# Patient Record
Sex: MALE
[De-identification: names, ages, dates, MRNs, and addresses within clinical notes are randomized per-mention and may not be internally consistent; named-entity substitution may affect disease eponyms.]

---

## 2020-07-30 DIAGNOSIS — Z01.818 ENCOUNTER FOR OTHER PREPROCEDURAL EXAMINATION: ICD-10-CM

## 2020-07-30 PROBLEM — Z00.00 ENCOUNTER FOR PREVENTIVE HEALTH EXAMINATION: Status: ACTIVE | Noted: 2020-07-30

## 2020-08-01 ENCOUNTER — APPOINTMENT (OUTPATIENT)
Dept: DISASTER EMERGENCY | Facility: CLINIC | Age: 65
End: 2020-08-01

## 2020-08-01 LAB — SARS-COV-2 N GENE NPH QL NAA+PROBE: NOT DETECTED

## 2024-01-08 ENCOUNTER — INPATIENT (INPATIENT)
Facility: HOSPITAL | Age: 69
LOS: 2 days | Discharge: HOME | DRG: 178 | End: 2024-01-11
Attending: INTERNAL MEDICINE | Admitting: INTERNAL MEDICINE
Payer: MEDICARE

## 2024-01-08 VITALS
WEIGHT: 210.1 LBS | TEMPERATURE: 101 F | SYSTOLIC BLOOD PRESSURE: 167 MMHG | OXYGEN SATURATION: 95 % | HEIGHT: 72 IN | HEART RATE: 91 BPM | RESPIRATION RATE: 18 BRPM | DIASTOLIC BLOOD PRESSURE: 90 MMHG

## 2024-01-08 PROCEDURE — 99285 EMERGENCY DEPT VISIT HI MDM: CPT

## 2024-01-08 NOTE — ED ADULT TRIAGE NOTE - CHIEF COMPLAINT QUOTE
vomiting since this morning 6-7 times   no abdominal pain vomiting since this morning 6-7 times, daughter reports she thinks there is blood in vomit   no abdominal pain

## 2024-01-08 NOTE — ED ADULT NURSE REASSESSMENT NOTE - NS ED NURSE REASSESS COMMENT FT1
pt approaches triage RN while waiting in waiting room, states he is now having new chest pain. EKG completed in triage and presented to MD.

## 2024-01-09 DIAGNOSIS — R50.9 FEVER, UNSPECIFIED: ICD-10-CM

## 2024-01-09 LAB
ALBUMIN SERPL ELPH-MCNC: 3.9 G/DL — SIGNIFICANT CHANGE UP (ref 3.3–5)
ALBUMIN SERPL ELPH-MCNC: 3.9 G/DL — SIGNIFICANT CHANGE UP (ref 3.3–5)
ALP SERPL-CCNC: 56 U/L — SIGNIFICANT CHANGE UP (ref 40–120)
ALP SERPL-CCNC: 56 U/L — SIGNIFICANT CHANGE UP (ref 40–120)
ALT FLD-CCNC: 17 U/L — SIGNIFICANT CHANGE UP (ref 10–45)
ALT FLD-CCNC: 17 U/L — SIGNIFICANT CHANGE UP (ref 10–45)
ANION GAP SERPL CALC-SCNC: 17 MMOL/L — SIGNIFICANT CHANGE UP (ref 5–17)
ANION GAP SERPL CALC-SCNC: 17 MMOL/L — SIGNIFICANT CHANGE UP (ref 5–17)
AST SERPL-CCNC: 32 U/L — SIGNIFICANT CHANGE UP (ref 10–40)
AST SERPL-CCNC: 32 U/L — SIGNIFICANT CHANGE UP (ref 10–40)
BASOPHILS # BLD AUTO: 0.04 K/UL — SIGNIFICANT CHANGE UP (ref 0–0.2)
BASOPHILS # BLD AUTO: 0.04 K/UL — SIGNIFICANT CHANGE UP (ref 0–0.2)
BASOPHILS NFR BLD AUTO: 0.9 % — SIGNIFICANT CHANGE UP (ref 0–2)
BASOPHILS NFR BLD AUTO: 0.9 % — SIGNIFICANT CHANGE UP (ref 0–2)
BILIRUB SERPL-MCNC: 0.7 MG/DL — SIGNIFICANT CHANGE UP (ref 0.2–1.2)
BILIRUB SERPL-MCNC: 0.7 MG/DL — SIGNIFICANT CHANGE UP (ref 0.2–1.2)
BUN SERPL-MCNC: 10 MG/DL — SIGNIFICANT CHANGE UP (ref 7–23)
BUN SERPL-MCNC: 10 MG/DL — SIGNIFICANT CHANGE UP (ref 7–23)
CALCIUM SERPL-MCNC: 9.2 MG/DL — SIGNIFICANT CHANGE UP (ref 8.4–10.5)
CALCIUM SERPL-MCNC: 9.2 MG/DL — SIGNIFICANT CHANGE UP (ref 8.4–10.5)
CHLORIDE SERPL-SCNC: 93 MMOL/L — LOW (ref 96–108)
CHLORIDE SERPL-SCNC: 93 MMOL/L — LOW (ref 96–108)
CO2 SERPL-SCNC: 21 MMOL/L — LOW (ref 22–31)
CO2 SERPL-SCNC: 21 MMOL/L — LOW (ref 22–31)
CREAT SERPL-MCNC: 1.01 MG/DL — SIGNIFICANT CHANGE UP (ref 0.5–1.3)
CREAT SERPL-MCNC: 1.01 MG/DL — SIGNIFICANT CHANGE UP (ref 0.5–1.3)
DACRYOCYTES BLD QL SMEAR: SLIGHT — SIGNIFICANT CHANGE UP
DACRYOCYTES BLD QL SMEAR: SLIGHT — SIGNIFICANT CHANGE UP
EGFR: 81 ML/MIN/1.73M2 — SIGNIFICANT CHANGE UP
EGFR: 81 ML/MIN/1.73M2 — SIGNIFICANT CHANGE UP
EOSINOPHIL # BLD AUTO: 0 K/UL — SIGNIFICANT CHANGE UP (ref 0–0.5)
EOSINOPHIL # BLD AUTO: 0 K/UL — SIGNIFICANT CHANGE UP (ref 0–0.5)
EOSINOPHIL NFR BLD AUTO: 0 % — SIGNIFICANT CHANGE UP (ref 0–6)
EOSINOPHIL NFR BLD AUTO: 0 % — SIGNIFICANT CHANGE UP (ref 0–6)
FLUAV AG NPH QL: SIGNIFICANT CHANGE UP
FLUAV AG NPH QL: SIGNIFICANT CHANGE UP
FLUBV AG NPH QL: SIGNIFICANT CHANGE UP
FLUBV AG NPH QL: SIGNIFICANT CHANGE UP
GLUCOSE SERPL-MCNC: 97 MG/DL — SIGNIFICANT CHANGE UP (ref 70–99)
GLUCOSE SERPL-MCNC: 97 MG/DL — SIGNIFICANT CHANGE UP (ref 70–99)
HCT VFR BLD CALC: 42.2 % — SIGNIFICANT CHANGE UP (ref 39–50)
HCT VFR BLD CALC: 42.2 % — SIGNIFICANT CHANGE UP (ref 39–50)
HGB BLD-MCNC: 14.2 G/DL — SIGNIFICANT CHANGE UP (ref 13–17)
HGB BLD-MCNC: 14.2 G/DL — SIGNIFICANT CHANGE UP (ref 13–17)
LIDOCAIN IGE QN: 22 U/L — SIGNIFICANT CHANGE UP (ref 7–60)
LIDOCAIN IGE QN: 22 U/L — SIGNIFICANT CHANGE UP (ref 7–60)
LYMPHOCYTES # BLD AUTO: 0.39 K/UL — LOW (ref 1–3.3)
LYMPHOCYTES # BLD AUTO: 0.39 K/UL — LOW (ref 1–3.3)
LYMPHOCYTES # BLD AUTO: 8.9 % — LOW (ref 13–44)
LYMPHOCYTES # BLD AUTO: 8.9 % — LOW (ref 13–44)
MAGNESIUM SERPL-MCNC: 2.2 MG/DL — SIGNIFICANT CHANGE UP (ref 1.6–2.6)
MAGNESIUM SERPL-MCNC: 2.2 MG/DL — SIGNIFICANT CHANGE UP (ref 1.6–2.6)
MANUAL SMEAR VERIFICATION: SIGNIFICANT CHANGE UP
MANUAL SMEAR VERIFICATION: SIGNIFICANT CHANGE UP
MCHC RBC-ENTMCNC: 30.9 PG — SIGNIFICANT CHANGE UP (ref 27–34)
MCHC RBC-ENTMCNC: 30.9 PG — SIGNIFICANT CHANGE UP (ref 27–34)
MCHC RBC-ENTMCNC: 33.6 GM/DL — SIGNIFICANT CHANGE UP (ref 32–36)
MCHC RBC-ENTMCNC: 33.6 GM/DL — SIGNIFICANT CHANGE UP (ref 32–36)
MCV RBC AUTO: 91.7 FL — SIGNIFICANT CHANGE UP (ref 80–100)
MCV RBC AUTO: 91.7 FL — SIGNIFICANT CHANGE UP (ref 80–100)
MONOCYTES # BLD AUTO: 0.85 K/UL — SIGNIFICANT CHANGE UP (ref 0–0.9)
MONOCYTES # BLD AUTO: 0.85 K/UL — SIGNIFICANT CHANGE UP (ref 0–0.9)
MONOCYTES NFR BLD AUTO: 19.6 % — HIGH (ref 2–14)
MONOCYTES NFR BLD AUTO: 19.6 % — HIGH (ref 2–14)
NEUTROPHILS # BLD AUTO: 3.08 K/UL — SIGNIFICANT CHANGE UP (ref 1.8–7.4)
NEUTROPHILS # BLD AUTO: 3.08 K/UL — SIGNIFICANT CHANGE UP (ref 1.8–7.4)
NEUTROPHILS NFR BLD AUTO: 68.8 % — SIGNIFICANT CHANGE UP (ref 43–77)
NEUTROPHILS NFR BLD AUTO: 68.8 % — SIGNIFICANT CHANGE UP (ref 43–77)
NEUTS BAND # BLD: 1.8 % — SIGNIFICANT CHANGE UP (ref 0–8)
NEUTS BAND # BLD: 1.8 % — SIGNIFICANT CHANGE UP (ref 0–8)
NT-PROBNP SERPL-SCNC: 381 PG/ML — HIGH (ref 0–300)
NT-PROBNP SERPL-SCNC: 381 PG/ML — HIGH (ref 0–300)
PLAT MORPH BLD: NORMAL — SIGNIFICANT CHANGE UP
PLAT MORPH BLD: NORMAL — SIGNIFICANT CHANGE UP
PLATELET # BLD AUTO: 201 K/UL — SIGNIFICANT CHANGE UP (ref 150–400)
PLATELET # BLD AUTO: 201 K/UL — SIGNIFICANT CHANGE UP (ref 150–400)
POIKILOCYTOSIS BLD QL AUTO: SLIGHT — SIGNIFICANT CHANGE UP
POIKILOCYTOSIS BLD QL AUTO: SLIGHT — SIGNIFICANT CHANGE UP
POTASSIUM SERPL-MCNC: 4.8 MMOL/L — SIGNIFICANT CHANGE UP (ref 3.5–5.3)
POTASSIUM SERPL-MCNC: 4.8 MMOL/L — SIGNIFICANT CHANGE UP (ref 3.5–5.3)
POTASSIUM SERPL-SCNC: 4.8 MMOL/L — SIGNIFICANT CHANGE UP (ref 3.5–5.3)
POTASSIUM SERPL-SCNC: 4.8 MMOL/L — SIGNIFICANT CHANGE UP (ref 3.5–5.3)
PROT SERPL-MCNC: 7 G/DL — SIGNIFICANT CHANGE UP (ref 6–8.3)
PROT SERPL-MCNC: 7 G/DL — SIGNIFICANT CHANGE UP (ref 6–8.3)
RBC # BLD: 4.6 M/UL — SIGNIFICANT CHANGE UP (ref 4.2–5.8)
RBC # BLD: 4.6 M/UL — SIGNIFICANT CHANGE UP (ref 4.2–5.8)
RBC # FLD: 12.7 % — SIGNIFICANT CHANGE UP (ref 10.3–14.5)
RBC # FLD: 12.7 % — SIGNIFICANT CHANGE UP (ref 10.3–14.5)
RBC BLD AUTO: ABNORMAL
RBC BLD AUTO: ABNORMAL
RSV RNA NPH QL NAA+NON-PROBE: SIGNIFICANT CHANGE UP
RSV RNA NPH QL NAA+NON-PROBE: SIGNIFICANT CHANGE UP
SARS-COV-2 RNA SPEC QL NAA+PROBE: DETECTED
SARS-COV-2 RNA SPEC QL NAA+PROBE: DETECTED
SODIUM SERPL-SCNC: 131 MMOL/L — LOW (ref 135–145)
SODIUM SERPL-SCNC: 131 MMOL/L — LOW (ref 135–145)
TARGETS BLD QL SMEAR: SLIGHT — SIGNIFICANT CHANGE UP
TARGETS BLD QL SMEAR: SLIGHT — SIGNIFICANT CHANGE UP
TROPONIN T, HIGH SENSITIVITY RESULT: 18 NG/L — SIGNIFICANT CHANGE UP (ref 0–51)
TROPONIN T, HIGH SENSITIVITY RESULT: 18 NG/L — SIGNIFICANT CHANGE UP (ref 0–51)
TROPONIN T, HIGH SENSITIVITY RESULT: 19 NG/L — SIGNIFICANT CHANGE UP (ref 0–51)
TROPONIN T, HIGH SENSITIVITY RESULT: 19 NG/L — SIGNIFICANT CHANGE UP (ref 0–51)
WBC # BLD: 4.36 K/UL — SIGNIFICANT CHANGE UP (ref 3.8–10.5)
WBC # BLD: 4.36 K/UL — SIGNIFICANT CHANGE UP (ref 3.8–10.5)
WBC # FLD AUTO: 4.36 K/UL — SIGNIFICANT CHANGE UP (ref 3.8–10.5)
WBC # FLD AUTO: 4.36 K/UL — SIGNIFICANT CHANGE UP (ref 3.8–10.5)

## 2024-01-09 PROCEDURE — 71046 X-RAY EXAM CHEST 2 VIEWS: CPT | Mod: 26

## 2024-01-09 RX ORDER — REMDESIVIR 5 MG/ML
100 INJECTION INTRAVENOUS EVERY 24 HOURS
Refills: 0 | Status: COMPLETED | OUTPATIENT
Start: 2024-01-10 | End: 2024-01-11

## 2024-01-09 RX ORDER — PREDNISOLONE SODIUM PHOSPHATE 1 %
1 DROPS OPHTHALMIC (EYE)
Refills: 0 | Status: DISCONTINUED | OUTPATIENT
Start: 2024-01-09 | End: 2024-01-11

## 2024-01-09 RX ORDER — SODIUM CHLORIDE 9 MG/ML
1000 INJECTION INTRAMUSCULAR; INTRAVENOUS; SUBCUTANEOUS ONCE
Refills: 0 | Status: COMPLETED | OUTPATIENT
Start: 2024-01-09 | End: 2024-01-09

## 2024-01-09 RX ORDER — AZITHROMYCIN 500 MG/1
500 TABLET, FILM COATED ORAL ONCE
Refills: 0 | Status: COMPLETED | OUTPATIENT
Start: 2024-01-09 | End: 2024-01-09

## 2024-01-09 RX ORDER — PANTOPRAZOLE SODIUM 20 MG/1
40 TABLET, DELAYED RELEASE ORAL EVERY 12 HOURS
Refills: 0 | Status: DISCONTINUED | OUTPATIENT
Start: 2024-01-09 | End: 2024-01-11

## 2024-01-09 RX ORDER — ONDANSETRON 8 MG/1
4 TABLET, FILM COATED ORAL ONCE
Refills: 0 | Status: COMPLETED | OUTPATIENT
Start: 2024-01-09 | End: 2024-01-09

## 2024-01-09 RX ORDER — CHLORHEXIDINE GLUCONATE 213 G/1000ML
1 SOLUTION TOPICAL DAILY
Refills: 0 | Status: DISCONTINUED | OUTPATIENT
Start: 2024-01-09 | End: 2024-01-11

## 2024-01-09 RX ORDER — SENNA PLUS 8.6 MG/1
2 TABLET ORAL AT BEDTIME
Refills: 0 | Status: DISCONTINUED | OUTPATIENT
Start: 2024-01-09 | End: 2024-01-11

## 2024-01-09 RX ORDER — POLYETHYLENE GLYCOL 3350 17 G/17G
17 POWDER, FOR SOLUTION ORAL DAILY
Refills: 0 | Status: DISCONTINUED | OUTPATIENT
Start: 2024-01-09 | End: 2024-01-11

## 2024-01-09 RX ORDER — ACETAMINOPHEN 500 MG
975 TABLET ORAL ONCE
Refills: 0 | Status: COMPLETED | OUTPATIENT
Start: 2024-01-09 | End: 2024-01-09

## 2024-01-09 RX ORDER — REMDESIVIR 5 MG/ML
INJECTION INTRAVENOUS
Refills: 0 | Status: COMPLETED | OUTPATIENT
Start: 2024-01-09 | End: 2024-01-11

## 2024-01-09 RX ORDER — ACETAMINOPHEN 500 MG
650 TABLET ORAL ONCE
Refills: 0 | Status: COMPLETED | OUTPATIENT
Start: 2024-01-09 | End: 2024-01-09

## 2024-01-09 RX ORDER — OFLOXACIN 0.3 %
1 DROPS OPHTHALMIC (EYE)
Refills: 0 | Status: DISCONTINUED | OUTPATIENT
Start: 2024-01-09 | End: 2024-01-11

## 2024-01-09 RX ORDER — FAMOTIDINE 10 MG/ML
20 INJECTION INTRAVENOUS ONCE
Refills: 0 | Status: COMPLETED | OUTPATIENT
Start: 2024-01-09 | End: 2024-01-09

## 2024-01-09 RX ORDER — REMDESIVIR 5 MG/ML
200 INJECTION INTRAVENOUS EVERY 24 HOURS
Refills: 0 | Status: COMPLETED | OUTPATIENT
Start: 2024-01-09 | End: 2024-01-09

## 2024-01-09 RX ORDER — LATANOPROST 0.05 MG/ML
1 SOLUTION/ DROPS OPHTHALMIC; TOPICAL AT BEDTIME
Refills: 0 | Status: DISCONTINUED | OUTPATIENT
Start: 2024-01-09 | End: 2024-01-11

## 2024-01-09 RX ORDER — CEFTRIAXONE 500 MG/1
1000 INJECTION, POWDER, FOR SOLUTION INTRAMUSCULAR; INTRAVENOUS ONCE
Refills: 0 | Status: COMPLETED | OUTPATIENT
Start: 2024-01-09 | End: 2024-01-09

## 2024-01-09 RX ADMIN — REMDESIVIR 200 MILLIGRAM(S): 5 INJECTION INTRAVENOUS at 19:42

## 2024-01-09 RX ADMIN — POLYETHYLENE GLYCOL 3350 17 GRAM(S): 17 POWDER, FOR SOLUTION ORAL at 23:49

## 2024-01-09 RX ADMIN — Medication 1 DROP(S): at 22:20

## 2024-01-09 RX ADMIN — CEFTRIAXONE 100 MILLIGRAM(S): 500 INJECTION, POWDER, FOR SOLUTION INTRAMUSCULAR; INTRAVENOUS at 12:22

## 2024-01-09 RX ADMIN — Medication 975 MILLIGRAM(S): at 07:05

## 2024-01-09 RX ADMIN — FAMOTIDINE 20 MILLIGRAM(S): 10 INJECTION INTRAVENOUS at 07:06

## 2024-01-09 RX ADMIN — Medication 1 DROP(S): at 23:48

## 2024-01-09 RX ADMIN — LATANOPROST 1 DROP(S): 0.05 SOLUTION/ DROPS OPHTHALMIC; TOPICAL at 23:48

## 2024-01-09 RX ADMIN — ONDANSETRON 4 MILLIGRAM(S): 8 TABLET, FILM COATED ORAL at 07:06

## 2024-01-09 RX ADMIN — AZITHROMYCIN 255 MILLIGRAM(S): 500 TABLET, FILM COATED ORAL at 09:24

## 2024-01-09 RX ADMIN — Medication 30 MILLILITER(S): at 07:05

## 2024-01-09 RX ADMIN — SODIUM CHLORIDE 1000 MILLILITER(S): 9 INJECTION INTRAMUSCULAR; INTRAVENOUS; SUBCUTANEOUS at 07:06

## 2024-01-09 RX ADMIN — PANTOPRAZOLE SODIUM 40 MILLIGRAM(S): 20 TABLET, DELAYED RELEASE ORAL at 19:42

## 2024-01-09 NOTE — ED ADULT NURSE NOTE - OBJECTIVE STATEMENT
68YM A&Ox4 Ambulatory PMHx of Afib presents to the ED c/o burning sensation in chest, nausea, nbnb vomiting, and fever 101 at home since yesterday s/p new food (fried pork). pt reports having chest pressure w/ sob worsening w/ exertion. pt denies recent travels, sick contact, urinary symptoms

## 2024-01-09 NOTE — ED ADULT NURSE NOTE - SUICIDE SCREENING QUESTION 2
Problem: Occupational Therapy Goal  Goal: Occupational Therapy Goal  Goals to be met by: 14 days     Patient will increase functional independence with ADLs by performing:    LE Dressing with Set-up Assistance and Assistive Devices as needed.  Toileting from bedside commode with Minimal Assistance for hygiene and clothing management.   Bathing from edge of bed with Minimal Assistance.  Stand pivot transfers with Modified [place].  Toilet transfer to bedside commode with Modified [place].   Outcome: Ongoing (interventions implemented as appropriate)  Pt continues to work towards goal with increased UE strength and endurance, to assist in transfers.       No

## 2024-01-09 NOTE — PATIENT PROFILE ADULT - FALL HARM RISK - UNIVERSAL INTERVENTIONS
Bed in lowest position, wheels locked, appropriate side rails in place/Call bell, personal items and telephone in reach/Instruct patient to call for assistance before getting out of bed or chair/Non-slip footwear when patient is out of bed/[place] to call system/Physically safe environment - no spills, clutter or unnecessary equipment/Purposeful Proactive Rounding/Room/bathroom lighting operational, light cord in reach Bed in lowest position, wheels locked, appropriate side rails in place/Call bell, personal items and telephone in reach/Instruct patient to call for assistance before getting out of bed or chair/Non-slip footwear when patient is out of bed/[place] to call system/Physically safe environment - no spills, clutter or unnecessary equipment/Purposeful Proactive Rounding/Room/bathroom lighting operational, light cord in reach

## 2024-01-09 NOTE — H&P ADULT - ASSESSMENT
68-year-old male with PMHx Afib (not on AC), presenting with nausea and vomiting for day.  Patient's son at bedside.  Patient states he has been having burning in his chest in the center.  Patient also reports occasional chest pressure with associated shortness of breath.  Patient denies any recent sick contacts, recent travel.  Patient does report similar symptoms when he was found to be exposed to E. coli.  Patient denies any dysuria.    covid 19 infection  - not hypoxic  - remdesivir x 3 days  - no need for steroids  - supportive care  - CT chest    r/o GI bleed  - coffee ground emesis  - ppi bid  - GI consult called    h/o afib  - rate controlled   - not on a/c

## 2024-01-09 NOTE — ED ADULT NURSE NOTE - CHIEF COMPLAINT QUOTE
vomiting since this morning 6-7 times, daughter reports she thinks there is blood in vomit   no abdominal pain

## 2024-01-09 NOTE — H&P ADULT - HISTORY OF PRESENT ILLNESS
68-year-old male with PMHx Afib (not on AC), presenting with nausea and vomiting for day.  Patient's son at bedside.  Patient states he has been having burning in his chest in the center.  Patient also reports occasional chest pressure with associated shortness of breath.  Patient denies any recent sick contacts, recent travel.  Patient does report similar symptoms when he was found to be exposed to E. coli.  Patient denies any dysuria.

## 2024-01-09 NOTE — H&P ADULT - NSHPLABSRESULTS_GEN_ALL_CORE
LABS:                        14.2   4.36  )-----------( 201      ( 09 Jan 2024 07:26 )             42.2     01-09    131<L>  |  93<L>  |  10  ----------------------------<  97  4.8   |  21<L>  |  1.01    Ca    9.2      09 Jan 2024 07:26  Mg     2.2     01-09    TPro  7.0  /  Alb  3.9  /  TBili  0.7  /  DBili  x   /  AST  32  /  ALT  17  /  AlkPhos  56  01-09      Urinalysis Basic - ( 09 Jan 2024 07:26 )    Color: x / Appearance: x / SG: x / pH: x  Gluc: 97 mg/dL / Ketone: x  / Bili: x / Urobili: x   Blood: x / Protein: x / Nitrite: x   Leuk Esterase: x / RBC: x / WBC x   Sq Epi: x / Non Sq Epi: x / Bacteria: x            RADIOLOGY & ADDITIONAL TESTS:

## 2024-01-09 NOTE — ED ADULT NURSE NOTE - NSFALLUNIVINTERV_ED_ALL_ED
Bed/Stretcher in lowest position, wheels locked, appropriate side rails in place/Call bell, personal items and telephone in reach/Instruct patient to call for assistance before getting out of bed/chair/stretcher/Non-slip footwear applied when patient is off stretcher/[place] to call system/Physically safe environment - no spills, clutter or unnecessary equipment/Purposeful proactive rounding/Room/bathroom lighting operational, light cord in reach Bed/Stretcher in lowest position, wheels locked, appropriate side rails in place/Call bell, personal items and telephone in reach/Instruct patient to call for assistance before getting out of bed/chair/stretcher/Non-slip footwear applied when patient is off stretcher/[place] to call system/Physically safe environment - no spills, clutter or unnecessary equipment/Purposeful proactive rounding/Room/bathroom lighting operational, light cord in reach

## 2024-01-09 NOTE — ED PROVIDER NOTE - PROGRESS NOTE DETAILS
Kathy Andrade DO PGY-3  Received sign out on patient. Patient with CAP on x-rays, with elevated CURB65 score. Had shared decision making regarding decision to admit and patient prefers admission.  Discussed with Dr. Hinton who accepted the patient.

## 2024-01-09 NOTE — CONSULT NOTE ADULT - SUBJECTIVE AND OBJECTIVE BOX
[facility]  Juwan Patel PA-C  [address] [place], [place] [id number] [phone]      Chief Complaint:  Patient is a 68y old  Male who presents with a chief complaint of     HPI:  68-year-old male with PMHx Afib (not on AC), presenting with nausea and vomiting for day.  Patient's son at bedside.  Patient states he has been having burning in his chest in the center.  Patient also reports occasional chest pressure with associated shortness of breath.  Patient denies any recent sick contacts, recent travel.  Patient does report similar symptoms when he was found to be exposed to E. coli.  Patient denies any dysuria.    GI asked to consult for ?hematemesis. pt seen and examined in ER, States N/V began yesterday. He did have some dark colored vomit after having coffee and some dark colored food? he also reports burning in chest. Usually takes Mylanta for burning pain which helps. He used to see a gastroenterologist part of [place] had upper gastrointestinal endoscopy 2 years ago reportedly normal per pt.     Allergies:  No Known Allergies      Medications:  chlorhexidine 2% Cloths 1 Application(s) Topical daily  latanoprost 0.005% Ophthalmic Solution 1 Drop(s) Right EYE at bedtime  ofloxacin 0.3% Solution 1 Drop(s) Right EYE four times a day  pantoprazole  Injectable 40 milliGRAM(s) IV Push every 12 hours  prednisoLONE acetate 1% Suspension 1 Drop(s) Right EYE four times a day  remdesivir  IVPB   IV Intermittent       PMHX/PSHX:  Afib        Family history:      Social History:     ROS:     General:  No wt loss, fevers, chills, night sweats, fatigue,   Eyes:  Good vision, no reported pain  ENT:  No sore throat, pain, runny nose, dysphagia  CV:  No pain, palpitations, hypo/hypertension  Resp:  No dyspnea, cough, tachypnea, wheezing  GI:  No pain, No nausea, No vomiting, No diarrhea, No constipation, No weight loss, No fever, No pruritis, No rectal bleeding, No tarry stools, No dysphagia,  :  No pain, bleeding, incontinence, nocturia  Muscle:  No pain, weakness  Neuro:  No weakness, tingling, memory problems  Psych:  No fatigue, insomnia, mood problems, depression  Endocrine:  No polyuria, polydipsia, cold/heat intolerance  Heme:  No petechiae, ecchymosis, easy bruisability  Skin:  No rash, tattoos, scars, edema      PHYSICAL EXAM:   Vital Signs:  Vital Signs Last 24 Hrs  T(C): 37.5 (09 Jan 2024 07:45), Max: 38.8 (09 Jan 2024 02:05)  T(F): 99.5 (09 Jan 2024 07:45), Max: 101.8 (09 Jan 2024 02:05)  HR: 76 (09 Jan 2024 12:15) (76 - 96)  BP: 125/84 (09 Jan 2024 12:15) (120/80 - 167/90)  BP(mean): --  RR: 16 (09 Jan 2024 12:15) (16 - 18)  SpO2: 98% (09 Jan 2024 12:15) (95% - 98%)    Parameters below as of 09 Jan 2024 12:15  Patient On (Oxygen Delivery Method): room air      Daily Height in cm: 182.88 (08 Jan 2024 20:39)    Daily     GENERAL:  Appears stated age,   HEENT:  NC/AT,    CHEST:  Full & symmetric excursion,   HEART:  Regular rhythm  ABDOMEN:  Soft, non-tender, non-distended,   EXTEREMITIES:  no cyanosis,clubbing or edema  SKIN:  No rash  NEURO:  Alert,    LABS:                        14.2   4.36  )-----------( 201      ( 09 Jan 2024 07:26 )             42.2     01-09    131<L>  |  93<L>  |  10  ----------------------------<  97  4.8   |  21<L>  |  1.01    Ca    9.2      09 Jan 2024 07:26  Mg     2.2     01-09    TPro  7.0  /  Alb  3.9  /  TBili  0.7  /  DBili  x   /  AST  32  /  ALT  17  /  AlkPhos  56  01-09    LIVER FUNCTIONS - ( 09 Jan 2024 07:26 )  Alb: 3.9 g/dL / Pro: 7.0 g/dL / ALK PHOS: 56 U/L / ALT: 17 U/L / AST: 32 U/L / GGT: x             Urinalysis Basic - ( 09 Jan 2024 07:26 )    Color: x / Appearance: x / SG: x / pH: x  Gluc: 97 mg/dL / Ketone: x  / Bili: x / Urobili: x   Blood: x / Protein: x / Nitrite: x   Leuk Esterase: x / RBC: x / WBC x   Sq Epi: x / Non Sq Epi: x / Bacteria: x      Amylase Serum--      Lipase serum22       Ammonia--      Imaging:           [facility]  Juwan Patel PA-C  [address] [place], [place] [id number] [phone]      Chief Complaint:  Patient is a 68y old  Male who presents with a chief complaint of     HPI:  68-year-old male with PMHx Afib (not on AC), presenting with nausea and vomiting for day.  Patient's son at bedside.  Patient states he has been having burning in his chest in the center.  Patient also reports occasional chest pressure with associated shortness of breath.  Patient denies any recent sick contacts, recent travel.  Patient does report similar symptoms when he was found to be exposed to E. coli.  Patient denies any dysuria.    GI asked to consult for ?hematemesis. pt seen and examined in ER, States N/V began yesterday. He did have some dark colored vomit after having coffee and some dark colored food? he also reports burning in chest. Usually takes Mylanta for burning pain which helps. He used to see a gastroenterologist part of [place] had upper gastrointestinal endoscopy 2 years ago reportedly normal per pt.     Allergies:  No Known Allergies      Medications:  chlorhexidine 2% Cloths 1 Application(s) Topical daily  latanoprost 0.005% Ophthalmic Solution 1 Drop(s) Right EYE at bedtime  ofloxacin 0.3% Solution 1 Drop(s) Right EYE four times a day  pantoprazole  Injectable 40 milliGRAM(s) IV Push every 12 hours  prednisoLONE acetate 1% Suspension 1 Drop(s) Right EYE four times a day  remdesivir  IVPB   IV Intermittent       PMHX/PSHX:  Afib        Family history:      Social History:     ROS:     General:  No wt loss, fevers, chills, night sweats, fatigue,   Eyes:  Good vision, no reported pain  ENT:  No sore throat, pain, runny nose, dysphagia  CV:  No pain, palpitations, hypo/hypertension  Resp:  No dyspnea, cough, tachypnea, wheezing  GI:  No pain, No nausea, No vomiting, No diarrhea, No constipation, No weight loss, No fever, No pruritis, No rectal bleeding, No tarry stools, No dysphagia,  :  No pain, bleeding, incontinence, nocturia  Muscle:  No pain, weakness  Neuro:  No weakness, tingling, memory problems  Psych:  No fatigue, insomnia, mood problems, depression  Endocrine:  No polyuria, polydipsia, cold/heat intolerance  Heme:  No petechiae, ecchymosis, easy bruisability  Skin:  No rash, tattoos, scars, edema      PHYSICAL EXAM:   Vital Signs:  Vital Signs Last 24 Hrs  T(C): 37.5 (09 Jan 2024 07:45), Max: 38.8 (09 Jan 2024 02:05)  T(F): 99.5 (09 Jan 2024 07:45), Max: 101.8 (09 Jan 2024 02:05)  HR: 76 (09 Jan 2024 12:15) (76 - 96)  BP: 125/84 (09 Jan 2024 12:15) (120/80 - 167/90)  BP(mean): --  RR: 16 (09 Jan 2024 12:15) (16 - 18)  SpO2: 98% (09 Jan 2024 12:15) (95% - 98%)    Parameters below as of 09 Jan 2024 12:15  Patient On (Oxygen Delivery Method): room air      Daily Height in cm: 182.88 (08 Jan 2024 20:39)    Daily     GENERAL:  Appears stated age,   HEENT:  NC/AT,    CHEST:  Full & symmetric excursion,   HEART:  Regular rhythm  ABDOMEN:  Soft, non-tender, non-distended,   EXTEREMITIES:  no cyanosis,clubbing or edema  SKIN:  No rash  NEURO:  Alert,    LABS:                        14.2   4.36  )-----------( 201      ( 09 Jan 2024 07:26 )             42.2     01-09    131<L>  |  93<L>  |  10  ----------------------------<  97  4.8   |  21<L>  |  1.01    Ca    9.2      09 Jan 2024 07:26  Mg     2.2     01-09    TPro  7.0  /  Alb  3.9  /  TBili  0.7  /  DBili  x   /  AST  32  /  ALT  17  /  AlkPhos  56  01-09    LIVER FUNCTIONS - ( 09 Jan 2024 07:26 )  Alb: 3.9 g/dL / Pro: 7.0 g/dL / ALK PHOS: 56 U/L / ALT: 17 U/L / AST: 32 U/L / GGT: x             Urinalysis Basic - ( 09 Jan 2024 07:26 )    Color: x / Appearance: x / SG: x / pH: x  Gluc: 97 mg/dL / Ketone: x  / Bili: x / Urobili: x   Blood: x / Protein: x / Nitrite: x   Leuk Esterase: x / RBC: x / WBC x   Sq Epi: x / Non Sq Epi: x / Bacteria: x      Amylase Serum--      Lipase serum22       Ammonia--      Imaging:

## 2024-01-09 NOTE — ED PROVIDER NOTE - SECONDARY DIAGNOSIS.
Not sure why she had her BMP rechecked. It wasn't supposed to be re checked as far as I can tell. Has she been taking her potassium supplement as directed?
Chest pain

## 2024-01-09 NOTE — ED PROVIDER NOTE - CLINICAL SUMMARY MEDICAL DECISION MAKING FREE TEXT BOX
68-year-old male with PMHx Afib (not on AC), presenting with nausea and vomiting for day.  Patient vitals pertinent for fever to 101.8.  Hemodynamically stable.  Physical exam with heart regular rate and rhythm, lungs clear to station, abdomen soft nondistended nontender.  Concern for possible ACS, viral illness, viral gastro, pneumonia, UTI, pancreatitis.  Will get labs, lipase, VBG, troponin, flu with COVID, chest x-ray, UA/UC.  Will treat with Tylenol, Pepcid, Maalox, Zofran, fluids. 68-year-old male with PMHx Afib (not on AC), presenting with nausea and vomiting for day.  Patient vitals pertinent for fever to 101.8.  Hemodynamically stable.  Physical exam with heart regular rate and rhythm, lungs clear to station, abdomen soft nondistended nontender.  Concern for possible ACS, viral illness, viral gastro, pneumonia, UTI, pancreatitis.  Will get labs, lipase, VBG, troponin, flu with COVID, chest x-ray, UA/UC.  Will treat with Tylenol, Pepcid, Maalox, Zofran, fluids.    [place]: 68 year old male with pmhx of afib not on ac, here wit n/v x 1 day. c/o mid chest pain. febrile here to 101.8.  PE: att exam: patient awake alert NAD . LUNGS CTAB no wheeze no crackle. CARD RRR no m/r/g.  Abdomen soft NT ND no rebound no guarding no CVA tenderness. EXT WWP no edema no calf tenderness CV 2+DP/PT bilaterally. neuro A&Ox3.  skin warm and dry no rash  Plan: Patient with fever, n/v c/o chest discomfort.  will get labs, cxr, ekg, r/o pneumonia versus viral syndrome versus acs. reassess

## 2024-01-10 LAB
ALBUMIN SERPL ELPH-MCNC: 3.8 G/DL — SIGNIFICANT CHANGE UP (ref 3.3–5)
ALBUMIN SERPL ELPH-MCNC: 3.8 G/DL — SIGNIFICANT CHANGE UP (ref 3.3–5)
ALP SERPL-CCNC: 51 U/L — SIGNIFICANT CHANGE UP (ref 40–120)
ALP SERPL-CCNC: 51 U/L — SIGNIFICANT CHANGE UP (ref 40–120)
ALT FLD-CCNC: 14 U/L — SIGNIFICANT CHANGE UP (ref 10–45)
ALT FLD-CCNC: 14 U/L — SIGNIFICANT CHANGE UP (ref 10–45)
ANION GAP SERPL CALC-SCNC: 11 MMOL/L — SIGNIFICANT CHANGE UP (ref 5–17)
ANION GAP SERPL CALC-SCNC: 11 MMOL/L — SIGNIFICANT CHANGE UP (ref 5–17)
AST SERPL-CCNC: 24 U/L — SIGNIFICANT CHANGE UP (ref 10–40)
AST SERPL-CCNC: 24 U/L — SIGNIFICANT CHANGE UP (ref 10–40)
BILIRUB DIRECT SERPL-MCNC: <0.1 MG/DL — SIGNIFICANT CHANGE UP (ref 0–0.3)
BILIRUB DIRECT SERPL-MCNC: <0.1 MG/DL — SIGNIFICANT CHANGE UP (ref 0–0.3)
BILIRUB INDIRECT FLD-MCNC: >0.2 MG/DL — SIGNIFICANT CHANGE UP (ref 0.2–1)
BILIRUB INDIRECT FLD-MCNC: >0.2 MG/DL — SIGNIFICANT CHANGE UP (ref 0.2–1)
BILIRUB SERPL-MCNC: 0.3 MG/DL — SIGNIFICANT CHANGE UP (ref 0.2–1.2)
BILIRUB SERPL-MCNC: 0.3 MG/DL — SIGNIFICANT CHANGE UP (ref 0.2–1.2)
BUN SERPL-MCNC: 10 MG/DL — SIGNIFICANT CHANGE UP (ref 7–23)
BUN SERPL-MCNC: 10 MG/DL — SIGNIFICANT CHANGE UP (ref 7–23)
CALCIUM SERPL-MCNC: 8.5 MG/DL — SIGNIFICANT CHANGE UP (ref 8.4–10.5)
CALCIUM SERPL-MCNC: 8.5 MG/DL — SIGNIFICANT CHANGE UP (ref 8.4–10.5)
CHLORIDE SERPL-SCNC: 99 MMOL/L — SIGNIFICANT CHANGE UP (ref 96–108)
CHLORIDE SERPL-SCNC: 99 MMOL/L — SIGNIFICANT CHANGE UP (ref 96–108)
CO2 SERPL-SCNC: 26 MMOL/L — SIGNIFICANT CHANGE UP (ref 22–31)
CO2 SERPL-SCNC: 26 MMOL/L — SIGNIFICANT CHANGE UP (ref 22–31)
CREAT SERPL-MCNC: 1.13 MG/DL — SIGNIFICANT CHANGE UP (ref 0.5–1.3)
CREAT SERPL-MCNC: 1.13 MG/DL — SIGNIFICANT CHANGE UP (ref 0.5–1.3)
CREAT SERPL-MCNC: 1.16 MG/DL — SIGNIFICANT CHANGE UP (ref 0.5–1.3)
CREAT SERPL-MCNC: 1.16 MG/DL — SIGNIFICANT CHANGE UP (ref 0.5–1.3)
EGFR: 68 ML/MIN/1.73M2 — SIGNIFICANT CHANGE UP
EGFR: 68 ML/MIN/1.73M2 — SIGNIFICANT CHANGE UP
EGFR: 70 ML/MIN/1.73M2 — SIGNIFICANT CHANGE UP
EGFR: 70 ML/MIN/1.73M2 — SIGNIFICANT CHANGE UP
FOLATE SERPL-MCNC: >20 NG/ML — SIGNIFICANT CHANGE UP
FOLATE SERPL-MCNC: >20 NG/ML — SIGNIFICANT CHANGE UP
GLUCOSE SERPL-MCNC: 91 MG/DL — SIGNIFICANT CHANGE UP (ref 70–99)
GLUCOSE SERPL-MCNC: 91 MG/DL — SIGNIFICANT CHANGE UP (ref 70–99)
HCT VFR BLD CALC: 42.2 % — SIGNIFICANT CHANGE UP (ref 39–50)
HCT VFR BLD CALC: 42.2 % — SIGNIFICANT CHANGE UP (ref 39–50)
HCV AB S/CO SERPL IA: 0.09 S/CO — SIGNIFICANT CHANGE UP (ref 0–0.99)
HCV AB S/CO SERPL IA: 0.09 S/CO — SIGNIFICANT CHANGE UP (ref 0–0.99)
HCV AB SERPL-IMP: SIGNIFICANT CHANGE UP
HCV AB SERPL-IMP: SIGNIFICANT CHANGE UP
HGB BLD-MCNC: 14 G/DL — SIGNIFICANT CHANGE UP (ref 13–17)
HGB BLD-MCNC: 14 G/DL — SIGNIFICANT CHANGE UP (ref 13–17)
MAGNESIUM SERPL-MCNC: 2.3 MG/DL — SIGNIFICANT CHANGE UP (ref 1.6–2.6)
MAGNESIUM SERPL-MCNC: 2.3 MG/DL — SIGNIFICANT CHANGE UP (ref 1.6–2.6)
MCHC RBC-ENTMCNC: 30.5 PG — SIGNIFICANT CHANGE UP (ref 27–34)
MCHC RBC-ENTMCNC: 30.5 PG — SIGNIFICANT CHANGE UP (ref 27–34)
MCHC RBC-ENTMCNC: 33.2 GM/DL — SIGNIFICANT CHANGE UP (ref 32–36)
MCHC RBC-ENTMCNC: 33.2 GM/DL — SIGNIFICANT CHANGE UP (ref 32–36)
MCV RBC AUTO: 91.9 FL — SIGNIFICANT CHANGE UP (ref 80–100)
MCV RBC AUTO: 91.9 FL — SIGNIFICANT CHANGE UP (ref 80–100)
NRBC # BLD: 0 /100 WBCS — SIGNIFICANT CHANGE UP (ref 0–0)
NRBC # BLD: 0 /100 WBCS — SIGNIFICANT CHANGE UP (ref 0–0)
PHOSPHATE SERPL-MCNC: 3.1 MG/DL — SIGNIFICANT CHANGE UP (ref 2.5–4.5)
PHOSPHATE SERPL-MCNC: 3.1 MG/DL — SIGNIFICANT CHANGE UP (ref 2.5–4.5)
PLATELET # BLD AUTO: 232 K/UL — SIGNIFICANT CHANGE UP (ref 150–400)
PLATELET # BLD AUTO: 232 K/UL — SIGNIFICANT CHANGE UP (ref 150–400)
POTASSIUM SERPL-MCNC: 4 MMOL/L — SIGNIFICANT CHANGE UP (ref 3.5–5.3)
POTASSIUM SERPL-MCNC: 4 MMOL/L — SIGNIFICANT CHANGE UP (ref 3.5–5.3)
POTASSIUM SERPL-SCNC: 4 MMOL/L — SIGNIFICANT CHANGE UP (ref 3.5–5.3)
POTASSIUM SERPL-SCNC: 4 MMOL/L — SIGNIFICANT CHANGE UP (ref 3.5–5.3)
PROCALCITONIN SERPL-MCNC: 0.15 NG/ML — HIGH (ref 0.02–0.1)
PROCALCITONIN SERPL-MCNC: 0.15 NG/ML — HIGH (ref 0.02–0.1)
PROT SERPL-MCNC: 6.8 G/DL — SIGNIFICANT CHANGE UP (ref 6–8.3)
PROT SERPL-MCNC: 6.8 G/DL — SIGNIFICANT CHANGE UP (ref 6–8.3)
RBC # BLD: 4.59 M/UL — SIGNIFICANT CHANGE UP (ref 4.2–5.8)
RBC # BLD: 4.59 M/UL — SIGNIFICANT CHANGE UP (ref 4.2–5.8)
RBC # FLD: 12.8 % — SIGNIFICANT CHANGE UP (ref 10.3–14.5)
RBC # FLD: 12.8 % — SIGNIFICANT CHANGE UP (ref 10.3–14.5)
SODIUM SERPL-SCNC: 136 MMOL/L — SIGNIFICANT CHANGE UP (ref 135–145)
SODIUM SERPL-SCNC: 136 MMOL/L — SIGNIFICANT CHANGE UP (ref 135–145)
TSH SERPL-MCNC: 1.36 UIU/ML — SIGNIFICANT CHANGE UP (ref 0.27–4.2)
TSH SERPL-MCNC: 1.36 UIU/ML — SIGNIFICANT CHANGE UP (ref 0.27–4.2)
VIT B12 SERPL-MCNC: 756 PG/ML — SIGNIFICANT CHANGE UP (ref 232–1245)
VIT B12 SERPL-MCNC: 756 PG/ML — SIGNIFICANT CHANGE UP (ref 232–1245)
WBC # BLD: 3.59 K/UL — LOW (ref 3.8–10.5)
WBC # BLD: 3.59 K/UL — LOW (ref 3.8–10.5)
WBC # FLD AUTO: 3.59 K/UL — LOW (ref 3.8–10.5)
WBC # FLD AUTO: 3.59 K/UL — LOW (ref 3.8–10.5)

## 2024-01-10 PROCEDURE — 71250 CT THORAX DX C-: CPT | Mod: 26

## 2024-01-10 RX ADMIN — CHLORHEXIDINE GLUCONATE 1 APPLICATION(S): 213 SOLUTION TOPICAL at 12:02

## 2024-01-10 RX ADMIN — Medication 1 DROP(S): at 11:44

## 2024-01-10 RX ADMIN — Medication 1 DROP(S): at 17:30

## 2024-01-10 RX ADMIN — Medication 1 DROP(S): at 05:37

## 2024-01-10 RX ADMIN — Medication 1 DROP(S): at 23:23

## 2024-01-10 RX ADMIN — LATANOPROST 1 DROP(S): 0.05 SOLUTION/ DROPS OPHTHALMIC; TOPICAL at 21:13

## 2024-01-10 RX ADMIN — PANTOPRAZOLE SODIUM 40 MILLIGRAM(S): 20 TABLET, DELAYED RELEASE ORAL at 05:37

## 2024-01-10 RX ADMIN — PANTOPRAZOLE SODIUM 40 MILLIGRAM(S): 20 TABLET, DELAYED RELEASE ORAL at 17:29

## 2024-01-10 RX ADMIN — REMDESIVIR 200 MILLIGRAM(S): 5 INJECTION INTRAVENOUS at 18:01

## 2024-01-10 NOTE — PROGRESS NOTE ADULT - SUBJECTIVE AND OBJECTIVE BOX
DATE OF SERVICE: 01-10-24 @ 16:45    Patient is a 68y old  Male who presents with a chief complaint of     SUBJECTIVE / OVERNIGHT EVENTS:  No chest pain. No shortness of breath. No complaints. No events overnight.     MEDICATIONS  (STANDING):  bisacodyl 5 milliGRAM(s) Oral at bedtime  chlorhexidine 2% Cloths 1 Application(s) Topical daily  latanoprost 0.005% Ophthalmic Solution 1 Drop(s) Right EYE at bedtime  ofloxacin 0.3% Solution 1 Drop(s) Right EYE four times a day  pantoprazole  Injectable 40 milliGRAM(s) IV Push every 12 hours  polyethylene glycol 3350 17 Gram(s) Oral daily  prednisoLONE acetate 1% Suspension 1 Drop(s) Right EYE four times a day  remdesivir  IVPB 100 milliGRAM(s) IV Intermittent every 24 hours  remdesivir  IVPB   IV Intermittent   senna 2 Tablet(s) Oral at bedtime    MEDICATIONS  (PRN):      Vital Signs Last 24 Hrs  T(C): 36.7 (10 [place] 2024 13:40), Max: 37.7 (10 [place] 2024 05:37)  T(F): 98.1 (10 [place] 2024 13:40), Max: 99.9 (10 [place] 2024 05:37)  HR: 91 (10 [place] 2024 13:40) (84 - 91)  BP: 131/83 (10 [place] 2024 13:40) (123/75 - 135/74)  BP(mean): --  RR: 18 (10 [place] 2024 13:40) (17 - 18)  SpO2: 95% (10 [place] 2024 13:40) (95% - 96%)    Parameters below as of 10 [place] 2024 13:40  Patient On (Oxygen Delivery Method): room air      CAPILLARY BLOOD GLUCOSE        I&O's Summary      PHYSICAL EXAM:  GENERAL: NAD, well-developed  HEAD:  Atraumatic, Normocephalic  EYES: EOMI, PERRLA, conjunctiva and sclera clear  NECK: Supple, No JVD  CHEST/LUNG: Clear to auscultation bilaterally; No wheeze  HEART: Regular rate and rhythm; No murmurs, rubs, or gallops  ABDOMEN: Soft, Nontender, Nondistended; Bowel sounds present  EXTREMITIES:  2+ Peripheral Pulses, No clubbing, cyanosis, or edema  PSYCH: AAOx3  NEUROLOGY: non-focal  SKIN: No rashes or lesions    LABS:                        14.0   3.59  )-----------( 232      ( 10 [place] 2024 07:06 )             42.2     01-10    136  |  99  |  10  ----------------------------<  91  4.0   |  26  |  1.13    Ca    8.5      10 [place] 2024 07:06  Phos  3.1     01-10  Mg     2.3     01-10    TPro  6.8  /  Alb  3.8  /  TBili  0.3  /  DBili  <0.1  /  AST  24  /  ALT  14  /  AlkPhos  51  01-10          Urinalysis Basic - ( 10 [place] 2024 07:06 )    Color: x / Appearance: x / SG: x / pH: x  Gluc: 91 mg/dL / Ketone: x  / Bili: x / Urobili: x   Blood: x / Protein: x / Nitrite: x   Leuk Esterase: x / RBC: x / WBC x   Sq Epi: x / Non Sq Epi: x / Bacteria: x    < from: CT Chest No Cont (01.10.24 @ 10:58) >  FINDINGS:    Lymph nodes: No lymphadenopathy.    Heart/vasculature: Heart is normal in size.  No pericardial effusion.    Airways/lungs/pleura: Central airways are clear. Bilateral curvilinear   opacities  most prominent in the lower lobes. Mild faint groundglass in   the right upper lobe (3-62) and the left lower lobe (3-125). Subpleural   consolidation in the superior segment of the right lower lobe (3-87). And   peripherally predominant consolidative opacities bilaterally. No pleural   effusion. No pneumothorax.    Upper abdomen: Partially imaged. Dilated right renal lower pole calyx   and/or proximal ureter. Adjacent partially included large cystic   structure which could be a dilated calyx or parapelvic cyst.   Subcentimeter right hepatic lobe hypodensity is too small to   characterize. Cholelithiasis. Nonspecific bilateral adrenal gland   thickening.    Bones/soft tissues: Osseous degenerative changes.  Soft tissues are   within normal limits.      IMPRESSION:    Patchy bilateral ground glass opacities and left lower lobe subpleural   consolidation, which may be infectious.    Lower lobe predominant reticular opacities which may be due to scarring   or an interstitial lung abnormality.    Partially imaged dilated right right lower pole calyx versus a parapelvic   cyst.    --- End of Report ---    < end of copied text >      RADIOLOGY & ADDITIONAL TESTS:    Imaging Personally Reviewed:    Consultant(s) Notes Reviewed:      Care Discussed with Consultants/Other Providers:

## 2024-01-10 NOTE — PROGRESS NOTE ADULT - SUBJECTIVE AND OBJECTIVE BOX
[facility]  Juwan Patel PA-C  [address] [place] [address]  [phone]      INTERVAL HPI/OVERNIGHT EVENTS:  pt seen and examined, no new events  no further vomiting  hgb stable     MEDICATIONS  (STANDING):  bisacodyl 5 milliGRAM(s) Oral at bedtime  chlorhexidine 2% Cloths 1 Application(s) Topical daily  latanoprost 0.005% Ophthalmic Solution 1 Drop(s) Right EYE at bedtime  ofloxacin 0.3% Solution 1 Drop(s) Right EYE four times a day  pantoprazole  Injectable 40 milliGRAM(s) IV Push every 12 hours  polyethylene glycol 3350 17 Gram(s) Oral daily  prednisoLONE acetate 1% Suspension 1 Drop(s) Right EYE four times a day  remdesivir  IVPB 100 milliGRAM(s) IV Intermittent every 24 hours  remdesivir  IVPB   IV Intermittent   senna 2 Tablet(s) Oral at bedtime    MEDICATIONS  (PRN):      Allergies    No Known Allergies    Intolerances        ROS:   General:  No wt loss, fevers, chills, night sweats, fatigue,   Eyes:  Good vision, no reported pain  ENT:  No sore throat, pain, runny nose, dysphagia  CV:  No pain, palpitations, hypo/hypertension  Resp:  No dyspnea, cough, tachypnea, wheezing  GI:  No pain, No nausea, No vomiting, No diarrhea, No constipation, No weight loss, No fever, No pruritis, No rectal bleeding, No tarry stools, No dysphagia,  :  No pain, bleeding, incontinence, nocturia  Muscle:  No pain, weakness  Neuro:  No weakness, tingling, memory problems  Psych:  No fatigue, insomnia, mood problems, depression  Endocrine:  No polyuria, polydipsia, cold/heat intolerance  Heme:  No petechiae, ecchymosis, easy bruisability  Skin:  No rash, tattoos, scars, edema      PHYSICAL EXAM:   Vital Signs:  Vital Signs Last 24 Hrs  T(C): 37.7 (10 [place] 2024 05:37), Max: 37.7 (10 [place] 2024 05:37)  T(F): 99.9 (10 [place] 2024 05:37), Max: 99.9 (10 [place] 2024 05:37)  HR: 86 (10 [place] 2024 05:37) (76 - 86)  BP: 123/75 (10 [place] 2024 05:37) (106/68 - 135/74)  BP(mean): --  RR: 18 (10 [place] 2024 05:37) (16 - 18)  SpO2: 95% (10 [place]abo 2024 05:37) (95% - 98%)    Parameters below as of 10 [place] 2024 05:37  Patient On (Oxygen Delivery Method): room air      Daily     Daily     GENERAL:  Appears stated age,   HEENT:  NC/AT,    CHEST:  Full & symmetric excursion,   HEART:  Regular rhythm,  ABDOMEN:  Soft, non-tender, non-distended,  EXTEREMITIES:  no cyanosis  SKIN:  No rash  NEURO:  Alert,       LABS:                        14.0   3.59  )-----------( 232      ( 10 [place]abo 2024 07:06 )             42.2     01-10    136  |  99  |  10  ----------------------------<  91  4.0   |  26  |  1.13    Ca    8.5      10 [place] 2024 07:06  Phos  3.1     01-10  Mg     2.3     01-10    TPro  6.8  /  Alb  3.8  /  TBili  0.3  /  DBili  <0.1  /  AST  24  /  ALT  14  /  AlkPhos  51  01-10      Urinalysis Basic - ( 10 [place] 2024 07:06 )    Color: x / Appearance: x / SG: x / pH: x  Gluc: 91 mg/dL / Ketone: x  / Bili: x / Urobili: x   Blood: x / Protein: x / Nitrite: x   Leuk Esterase: x / RBC: x / WBC x   Sq Epi: x / Non Sq Epi: x / Bacteria: x        RADIOLOGY & ADDITIONAL TESTS:   [facility]  Juwan Patel PA-C  [address] [place] [address]  [phone]      INTERVAL HPI/OVERNIGHT EVENTS:  pt seen and examined, no new events  no further vomiting  hgb stable     MEDICATIONS  (STANDING):  bisacodyl 5 milliGRAM(s) Oral at bedtime  chlorhexidine 2% Cloths 1 Application(s) Topical daily  latanoprost 0.005% Ophthalmic Solution 1 Drop(s) Right EYE at bedtime  ofloxacin 0.3% Solution 1 Drop(s) Right EYE four times a day  pantoprazole  Injectable 40 milliGRAM(s) IV Push every 12 hours  polyethylene glycol 3350 17 Gram(s) Oral daily  prednisoLONE acetate 1% Suspension 1 Drop(s) Right EYE four times a day  remdesivir  IVPB 100 milliGRAM(s) IV Intermittent every 24 hours  remdesivir  IVPB   IV Intermittent   senna 2 Tablet(s) Oral at bedtime    MEDICATIONS  (PRN):      Allergies    No Known Allergies    Intolerances        ROS:   General:  No wt loss, fevers, chills, night sweats, fatigue,   Eyes:  Good vision, no reported pain  ENT:  No sore throat, pain, runny nose, dysphagia  CV:  No pain, palpitations, hypo/hypertension  Resp:  No dyspnea, cough, tachypnea, wheezing  GI:  No pain, No nausea, No vomiting, No diarrhea, No constipation, No weight loss, No fever, No pruritis, No rectal bleeding, No tarry stools, No dysphagia,  :  No pain, bleeding, incontinence, nocturia  Muscle:  No pain, weakness  Neuro:  No weakness, tingling, memory problems  Psych:  No fatigue, insomnia, mood problems, depression  Endocrine:  No polyuria, polydipsia, cold/heat intolerance  Heme:  No petechiae, ecchymosis, easy bruisability  Skin:  No rash, tattoos, scars, edema      PHYSICAL EXAM:   Vital Signs:  Vital Signs Last 24 Hrs  T(C): 37.7 (10 [place] 2024 05:37), Max: 37.7 (10 [place] 2024 05:37)  T(F): 99.9 (10 [place] 2024 05:37), Max: 99.9 (10 [place] 2024 05:37)  HR: 86 (10 [place] 2024 05:37) (76 - 86)  BP: 123/75 (10 [place] 2024 05:37) (106/68 - 135/74)  BP(mean): --  RR: 18 (10 [place] 2024 05:37) (16 - 18)  SpO2: 95% (10 [place] 2024 05:37) (95% - 98%)    Parameters below as of 10 [place] 2024 05:37  Patient On (Oxygen Delivery Method): room air      Daily     Daily     GENERAL:  Appears stated age,   HEENT:  NC/AT,    CHEST:  Full & symmetric excursion,   HEART:  Regular rhythm,  ABDOMEN:  Soft, non-tender, non-distended,  EXTEREMITIES:  no cyanosis  SKIN:  No rash  NEURO:  Alert,       LABS:                        14.0   3.59  )-----------( 232      ( 10 [place] 2024 07:06 )             42.2     01-10    136  |  99  |  10  ----------------------------<  91  4.0   |  26  |  1.13    Ca    8.5      10 [place] 2024 07:06  Phos  3.1     01-10  Mg     2.3     01-10    TPro  6.8  /  Alb  3.8  /  TBili  0.3  /  DBili  <0.1  /  AST  24  /  ALT  14  /  AlkPhos  51  01-10      Urinalysis Basic - ( 10 [place] 2024 07:06 )    Color: x / Appearance: x / SG: x / pH: x  Gluc: 91 mg/dL / Ketone: x  / Bili: x / Urobili: x   Blood: x / Protein: x / Nitrite: x   Leuk Esterase: x / RBC: x / WBC x   Sq Epi: x / Non Sq Epi: x / Bacteria: x        RADIOLOGY & ADDITIONAL TESTS:

## 2024-01-11 ENCOUNTER — TRANSCRIPTION ENCOUNTER (OUTPATIENT)
Age: 69
End: 2024-01-11

## 2024-01-11 VITALS
SYSTOLIC BLOOD PRESSURE: 129 MMHG | RESPIRATION RATE: 18 BRPM | OXYGEN SATURATION: 94 % | TEMPERATURE: 99 F | HEART RATE: 79 BPM | DIASTOLIC BLOOD PRESSURE: 80 MMHG

## 2024-01-11 LAB
ALBUMIN SERPL ELPH-MCNC: 3.5 G/DL — SIGNIFICANT CHANGE UP (ref 3.3–5)
ALBUMIN SERPL ELPH-MCNC: 3.5 G/DL — SIGNIFICANT CHANGE UP (ref 3.3–5)
ALP SERPL-CCNC: 47 U/L — SIGNIFICANT CHANGE UP (ref 40–120)
ALP SERPL-CCNC: 47 U/L — SIGNIFICANT CHANGE UP (ref 40–120)
ALT FLD-CCNC: 15 U/L — SIGNIFICANT CHANGE UP (ref 10–45)
ALT FLD-CCNC: 15 U/L — SIGNIFICANT CHANGE UP (ref 10–45)
AST SERPL-CCNC: 22 U/L — SIGNIFICANT CHANGE UP (ref 10–40)
AST SERPL-CCNC: 22 U/L — SIGNIFICANT CHANGE UP (ref 10–40)
BILIRUB DIRECT SERPL-MCNC: <0.1 MG/DL — SIGNIFICANT CHANGE UP (ref 0–0.3)
BILIRUB DIRECT SERPL-MCNC: <0.1 MG/DL — SIGNIFICANT CHANGE UP (ref 0–0.3)
BILIRUB INDIRECT FLD-MCNC: >0.1 MG/DL — LOW (ref 0.2–1)
BILIRUB INDIRECT FLD-MCNC: >0.1 MG/DL — LOW (ref 0.2–1)
BILIRUB SERPL-MCNC: 0.2 MG/DL — SIGNIFICANT CHANGE UP (ref 0.2–1.2)
BILIRUB SERPL-MCNC: 0.2 MG/DL — SIGNIFICANT CHANGE UP (ref 0.2–1.2)
CREAT SERPL-MCNC: 1 MG/DL — SIGNIFICANT CHANGE UP (ref 0.5–1.3)
CREAT SERPL-MCNC: 1 MG/DL — SIGNIFICANT CHANGE UP (ref 0.5–1.3)
D DIMER BLD IA.RAPID-MCNC: 346 NG/ML DDU — HIGH
D DIMER BLD IA.RAPID-MCNC: 346 NG/ML DDU — HIGH
EGFR: 81 ML/MIN/1.73M2 — SIGNIFICANT CHANGE UP
EGFR: 81 ML/MIN/1.73M2 — SIGNIFICANT CHANGE UP
PROT SERPL-MCNC: 6.3 G/DL — SIGNIFICANT CHANGE UP (ref 6–8.3)
PROT SERPL-MCNC: 6.3 G/DL — SIGNIFICANT CHANGE UP (ref 6–8.3)

## 2024-01-11 PROCEDURE — 85025 COMPLETE CBC W/AUTO DIFF WBC: CPT

## 2024-01-11 PROCEDURE — 71250 CT THORAX DX C-: CPT

## 2024-01-11 PROCEDURE — 85014 HEMATOCRIT: CPT

## 2024-01-11 PROCEDURE — 83735 ASSAY OF MAGNESIUM: CPT

## 2024-01-11 PROCEDURE — 83690 ASSAY OF LIPASE: CPT

## 2024-01-11 PROCEDURE — 84443 ASSAY THYROID STIM HORMONE: CPT

## 2024-01-11 PROCEDURE — 96374 THER/PROPH/DIAG INJ IV PUSH: CPT

## 2024-01-11 PROCEDURE — 85027 COMPLETE CBC AUTOMATED: CPT

## 2024-01-11 PROCEDURE — 84484 ASSAY OF TROPONIN QUANT: CPT

## 2024-01-11 PROCEDURE — 84145 PROCALCITONIN (PCT): CPT

## 2024-01-11 PROCEDURE — 80048 BASIC METABOLIC PNL TOTAL CA: CPT

## 2024-01-11 PROCEDURE — 82947 ASSAY GLUCOSE BLOOD QUANT: CPT

## 2024-01-11 PROCEDURE — 82746 ASSAY OF FOLIC ACID SERUM: CPT

## 2024-01-11 PROCEDURE — 83880 ASSAY OF NATRIURETIC PEPTIDE: CPT

## 2024-01-11 PROCEDURE — 85379 FIBRIN DEGRADATION QUANT: CPT

## 2024-01-11 PROCEDURE — 80053 COMPREHEN METABOLIC PANEL: CPT

## 2024-01-11 PROCEDURE — 85018 HEMOGLOBIN: CPT

## 2024-01-11 PROCEDURE — 84132 ASSAY OF SERUM POTASSIUM: CPT

## 2024-01-11 PROCEDURE — 99285 EMERGENCY DEPT VISIT HI MDM: CPT

## 2024-01-11 PROCEDURE — 82607 VITAMIN B-12: CPT

## 2024-01-11 PROCEDURE — 96375 TX/PRO/DX INJ NEW DRUG ADDON: CPT

## 2024-01-11 PROCEDURE — 87637 SARSCOV2&INF A&B&RSV AMP PRB: CPT

## 2024-01-11 PROCEDURE — 83605 ASSAY OF LACTIC ACID: CPT

## 2024-01-11 PROCEDURE — 82565 ASSAY OF CREATININE: CPT

## 2024-01-11 PROCEDURE — 36415 COLL VENOUS BLD VENIPUNCTURE: CPT

## 2024-01-11 PROCEDURE — 82330 ASSAY OF CALCIUM: CPT

## 2024-01-11 PROCEDURE — 82803 BLOOD GASES ANY COMBINATION: CPT

## 2024-01-11 PROCEDURE — 84295 ASSAY OF SERUM SODIUM: CPT

## 2024-01-11 PROCEDURE — 84100 ASSAY OF PHOSPHORUS: CPT

## 2024-01-11 PROCEDURE — 71046 X-RAY EXAM CHEST 2 VIEWS: CPT

## 2024-01-11 PROCEDURE — 86803 HEPATITIS C AB TEST: CPT

## 2024-01-11 PROCEDURE — 80076 HEPATIC FUNCTION PANEL: CPT

## 2024-01-11 PROCEDURE — 82435 ASSAY OF BLOOD CHLORIDE: CPT

## 2024-01-11 RX ORDER — SENNA PLUS 8.6 MG/1
2 TABLET ORAL
Qty: 0 | Refills: 0 | DISCHARGE
Start: 2024-01-11

## 2024-01-11 RX ORDER — PANTOPRAZOLE SODIUM 20 MG/1
1 TABLET, DELAYED RELEASE ORAL
Qty: 30 | Refills: 0
Start: 2024-01-11 | End: 2024-02-09

## 2024-01-11 RX ORDER — POLYETHYLENE GLYCOL 3350 17 G/17G
17 POWDER, FOR SOLUTION ORAL
Qty: 0 | Refills: 0 | DISCHARGE
Start: 2024-01-11

## 2024-01-11 RX ADMIN — Medication 1 DROP(S): at 17:49

## 2024-01-11 RX ADMIN — Medication 1 DROP(S): at 11:21

## 2024-01-11 RX ADMIN — CHLORHEXIDINE GLUCONATE 1 APPLICATION(S): 213 SOLUTION TOPICAL at 11:24

## 2024-01-11 RX ADMIN — Medication 1 DROP(S): at 06:19

## 2024-01-11 RX ADMIN — REMDESIVIR 200 MILLIGRAM(S): 5 INJECTION INTRAVENOUS at 17:48

## 2024-01-11 RX ADMIN — Medication 1 DROP(S): at 11:22

## 2024-01-11 RX ADMIN — PANTOPRAZOLE SODIUM 40 MILLIGRAM(S): 20 TABLET, DELAYED RELEASE ORAL at 06:19

## 2024-01-11 RX ADMIN — Medication 1 DROP(S): at 17:48

## 2024-01-11 RX ADMIN — PANTOPRAZOLE SODIUM 40 MILLIGRAM(S): 20 TABLET, DELAYED RELEASE ORAL at 17:49

## 2024-01-11 RX ADMIN — POLYETHYLENE GLYCOL 3350 17 GRAM(S): 17 POWDER, FOR SOLUTION ORAL at 11:23

## 2024-01-11 NOTE — CONSULT NOTE ADULT - ASSESSMENT
68-year-old male with PMHx Afib (not on AC), presenting with nausea and vomiting for day.  Patient's son at bedside.  Patient states he has been having burning in his chest in the center.  Patient also reports occasional chest pressure with associated shortness of breath.  Patient denies any recent sick contacts, recent travel.  Patient does report similar symptoms when he was found to be exposed to E. coli.  Patient denies any dysuria. (09 Jan 2024 13:51)    now heis found to have covid infection:  he is actually doing great : he snot sob:  no chest pain : feels normal  :  no smoker:  never had pe or dvt:   never had any pulm issues before :    covid infection:   a FIBRILLATION:         covid infection:   -FINISHING 3 DAYS OF COVID TREATMENT:   -NOT ON DEXA SECONDARY TO NOT BEING HYPOXIC:   -CT CHEST REVEALED?  SHOWED: Patchy bilateral ground glass opacities and left lower lobe subpleural consolidation, which may be infectious. Lower lobe predominant reticular opacities which may be due to scarring or an interstitial lung abnormality. Partially imaged dilated right right lower pole calyx versus a parapelvic cyst.  - I don't think he is no infected : bacterial wise:  he ha  no leucocytosis    of ever:  but has may have underlying lung disease:  he must follow with his outpt pulm : and he would need full PFT   - d dimer is not that high ; expected in covid:  his clinical probability of vte is very low to me:  he is sitting in bed with nosb ; and has normal sao2: with no tachycardia  A FIBRILLATION:   -not on ac: ;

## 2024-01-11 NOTE — PROGRESS NOTE ADULT - ASSESSMENT
68-year-old male with PMHx Afib (not on AC), presenting with nausea and vomiting for day.  Patient's son at bedside.  Patient states he has been having burning in his chest in the center.  Patient also reports occasional chest pressure with associated shortness of breath.  Patient denies any recent sick contacts, recent travel.  Patient does report similar symptoms when he was found to be exposed to E. coli.  Patient denies any dysuria.    covid 19 infection  - not hypoxic  - remdesivir x 3 days  - no need for steroids  - supportive care  - CT chest    r/o GI bleed  - coffee ground emesis  - ppi bid  - GI consult called    h/o afib  - rate controlled   - not on a/c    
68-year-old male with PMHx Afib (not on AC), presenting with nausea and vomiting for day.  Patient's son at bedside.  Patient states he has been having burning in his chest in the center.  Patient also reports occasional chest pressure with associated shortness of breath.  Patient denies any recent sick contacts, recent travel.  Patient does report similar symptoms when he was found to be exposed to E. coli.  Patient denies any dysuria.    covid 19 infection  - not hypoxic  - remdesivir x 3 days  - no need for steroids  - supportive care  - CT chest done    r/o GI bleed  - coffee ground emesis  - ppi qd  - GI consult noted  - egd not reccomended    h/o afib  - rate controlled   - not on a/c    can be discharged  
nausea/vomiting   COVID-19  GERD    doubt gi bleed   hgb normal   cont to monitor   PPI BID  diet as tolerated  remdesivir x 3 days per primary   dc planning per primary   d/w pt   
no
nausea/vomiting   COVID-19  GERD    doubt gi bleed   hgb normal   cont to monitor   PPI BID  diet as tolerated  remdesivir x 3 days per primary   d/w pt     I reviewed the overnight course of events on the unit, re-confirming the patient history. I discussed the care with the patient and their family  The plan of care was discussed with the physician assistant and modifications were made to the notation where appropriate.   Differential diagnosis and plan of care discussed with patient after the evaluation  50 minutes spent on total encounter of which more than fifty percent of the encounter was spent counseling and/or coordinating care by the attending physician.  Advanced care planning was discussed with patient and family.  Advanced care planning forms were reviewed and discussed.  Risks, benefits and alternatives of gastroenterologic procedures were discussed in detail and all questions were answered.

## 2024-01-11 NOTE — DISCHARGE NOTE PROVIDER - HOSPITAL COURSE
HPI:   68-year-old male with PMHx Afib (not on AC), presenting with nausea and vomiting for day.  Patient's son at bedside.  Patient states he has been having burning in his chest in the center.  Patient also reports occasional chest pressure with associated shortness of breath.  Patient denies any recent sick contacts, recent travel.  Patient does report similar symptoms when he was found to be exposed to E. coli.  Patient denies any dysuria. (09 Jan 2024 13:51)    Hospital Course:      Important Medication Changes and Reason:    Active or Pending Issues Requiring Follow-up:    Advanced Directives:   [ ] Full code  [ ] DNR  [ ] Hospice    Discharge Diagnoses:         HPI:   68-year-old male with PMHx Afib (not on AC), presenting with nausea and vomiting for day.  Patient's son at bedside.  Patient states he has been having burning in his chest in the center.  Patient also reports occasional chest pressure with associated shortness of breath.  Patient denies any recent sick contacts, recent travel.  Patient does report similar symptoms when he was found to be exposed to E. coli.  Patient denies any dysuria. (09 Jan 2024 13:51)    Hospital Course:    Covid s/p Remdesivir  on RA followed by Pulmonary   GIB followed by GI no interventions Labs stable c/w protonix       Important Medication Changes and Reason:  protonix     Active or Pending Issues Requiring Follow-up:  Pulmonary     Advanced Directives:   [ x] Full code  [ ] DNR  [ ] Hospice    Discharge Diagnoses:  Covid   GIB          HPI:   68-year-old male with PMHx Afib (not on AC), presenting with nausea and vomiting for day.  Patient's son at bedside.  Patient states he has been having burning in his chest in the center.  Patient also reports occasional chest pressure with associated shortness of breath.  Patient denies any recent sick contacts, recent travel.  Patient does report similar symptoms when he was found to be exposed to E. coli.  Patient denies any dysuria. (09 Jan 2024 13:51)    Hospital Course:    Covid s/p Remdesivir  on RA followed by Pulmonary Rpt CT Chest 6 weeks   GIB followed by GI no interventions Labs stable c/w protonix       Important Medication Changes and Reason:  protonix     Active or Pending Issues Requiring Follow-up:  Pulmonary     Advanced Directives:   [ x] Full code  [ ] DNR  [ ] Hospice    Discharge Diagnoses:  Covid   GIB

## 2024-01-11 NOTE — PROGRESS NOTE ADULT - SUBJECTIVE AND OBJECTIVE BOX
DATE OF SERVICE: 01-11-24 @ 16:16    Patient is a 68y old  Male who presents with a chief complaint of     SUBJECTIVE / OVERNIGHT EVENTS:  No chest pain. No shortness of breath. No complaints. No events overnight.     MEDICATIONS  (STANDING):  bisacodyl 5 milliGRAM(s) Oral at bedtime  chlorhexidine 2% Cloths 1 Application(s) Topical daily  latanoprost 0.005% Ophthalmic Solution 1 Drop(s) Right EYE at bedtime  ofloxacin 0.3% Solution 1 Drop(s) Right EYE four times a day  pantoprazole  Injectable 40 milliGRAM(s) IV Push every 12 hours  polyethylene glycol 3350 17 Gram(s) Oral daily  prednisoLONE acetate 1% Suspension 1 Drop(s) Right EYE four times a day  remdesivir  IVPB 100 milliGRAM(s) IV Intermittent every 24 hours  remdesivir  IVPB   IV Intermittent   senna 2 Tablet(s) Oral at bedtime    MEDICATIONS  (PRN):      Vital Signs Last 24 Hrs  T(C): 37.1 (11 Jan 2024 14:08), Max: 37.1 (11 Jan 2024 14:08)  T(F): 98.7 (11 Jan 2024 14:08), Max: 98.7 (11 Jan 2024 14:08)  HR: 79 (11 Jan 2024 14:08) (68 - 85)  BP: 129/80 (11 Jan 2024 14:08) (106/65 - 129/80)  BP(mean): --  RR: 18 (11 Jan 2024 14:08) (18 - 18)  SpO2: 94% (11 Jan 2024 14:08) (94% - 97%)    Parameters below as of 11 Jan 2024 14:08  Patient On (Oxygen Delivery Method): room air      CAPILLARY BLOOD GLUCOSE        I&O's Summary    11 Jan 2024 07:01  -  11 Jan 2024 16:16  --------------------------------------------------------  IN: 240 mL / OUT: 0 mL / NET: 240 mL        PHYSICAL EXAM:  GENERAL: NAD, well-developed  HEAD:  Atraumatic, Normocephalic  EYES: EOMI, PERRLA, conjunctiva and sclera clear  NECK: Supple, No JVD  CHEST/LUNG: Clear to auscultation bilaterally; No wheeze  HEART: Regular rate and rhythm; No murmurs, rubs, or gallops  ABDOMEN: Soft, Nontender, Nondistended; Bowel sounds present  EXTREMITIES:  2+ Peripheral Pulses, No clubbing, cyanosis, or edema  PSYCH: AAOx3  NEUROLOGY: non-focal  SKIN: No rashes or lesions    LABS:                        14.0   3.59  )-----------( 232      ( 10 [place] 2024 07:06 )             42.2     01-11    x   |  x   |  x   ----------------------------<  x   x    |  x   |  1.00    Ca    8.5      10 [place] 2024 07:06  Phos  3.1     01-10  Mg     2.3     01-10    TPro  6.3  /  Alb  3.5  /  TBili  0.2  /  DBili  <0.1  /  AST  22  /  ALT  15  /  AlkPhos  47  01-11          Urinalysis Basic - ( 10 [place] 2024 07:06 )    Color: x / Appearance: x / SG: x / pH: x  Gluc: 91 mg/dL / Ketone: x  / Bili: x / Urobili: x   Blood: x / Protein: x / Nitrite: x   Leuk Esterase: x / RBC: x / WBC x   Sq Epi: x / Non Sq Epi: x / Bacteria: x    < from: CT Chest No Cont (01.10.24 @ 10:58) >  FINDINGS:    Lymph nodes: No lymphadenopathy.    Heart/vasculature: Heart is normal in size.  No pericardial effusion.    Airways/lungs/pleura: Central airways are clear. Bilateral curvilinear   opacities  most prominent in the lower lobes. Mild faint groundglass in   the right upper lobe (3-62) and the left lower lobe (3-125). Subpleural   consolidation in the superior segment of the right lower lobe (3-87). And   peripherally predominant consolidative opacities bilaterally. No pleural   effusion. No pneumothorax.    Upper abdomen: Partially imaged. Dilated right renal lower pole calyx   and/or proximal ureter. Adjacent partially included large cystic   structure which could be a dilated calyx or parapelvic cyst.   Subcentimeter right hepatic lobe hypodensity is too small to   characterize. Cholelithiasis. Nonspecific bilateral adrenal gland   thickening.    Bones/soft tissues: Osseous degenerative changes.  Soft tissues are   within normal limits.      IMPRESSION:    Patchy bilateral ground glass opacities and left lower lobe subpleural   consolidation, which may be infectious.    Lower lobe predominant reticular opacities which may be due to scarring   or an interstitial lung abnormality.    Partially imaged dilated right right lower pole calyx versus a parapelvic   cyst.    < end of copied text >      RADIOLOGY & ADDITIONAL TESTS:    Imaging Personally Reviewed:    Consultant(s) Notes Reviewed:      Care Discussed with Consultants/Other Providers:

## 2024-01-11 NOTE — DISCHARGE NOTE PROVIDER - PROVIDER TOKENS
PROVIDER:[TOKEN:[[id number]],FOLLOWUP:[1 week]] PROVIDER:[TOKEN:[[id number]],FOLLOWUP:[1 week]] PROVIDER:[TOKEN:[[id number]],FOLLOWUP:[1 week]] PROVIDER:[TOKEN:[[id number]],FOLLOWUP:[1 week]]

## 2024-01-11 NOTE — DISCHARGE NOTE NURSING/CASE MANAGEMENT/SOCIAL WORK - NSDCFUADDAPPT_GEN_ALL_CORE_FT
APPTS ARE READY TO BE MADE: [x ] YES    Best Family or Patient Contact (if needed):    Additional Information about above appointments (if needed):    1: Lilo   2:   3:     Other comments or requests:

## 2024-01-11 NOTE — DISCHARGE NOTE PROVIDER - NSDCMRMEDTOKEN_GEN_ALL_CORE_FT
bisacodyl 5 mg oral delayed release tablet: 1 tab(s) orally once a day (at bedtime)  Daily Garrett oral tablet: 1 tab(s) orally once a day  latanoprost 0.005% ophthalmic solution: 1 drop(s) in the right eye once a day (at bedtime)  ofloxacin 0.3% ophthalmic solution: 1 drop(s) in the right eye 4 times a day  polyethylene glycol 3350 oral powder for reconstitution: 17 gram(s) orally once a day  prednisoLONE acetate 1% ophthalmic suspension: 1 drop(s) in the right eye 4 times a day  senna leaf extract oral tablet: 2 tab(s) orally once a day (at bedtime)   bisacodyl 5 mg oral delayed release tablet: 1 tab(s) orally once a day (at bedtime)  Daily Garrett oral tablet: 1 tab(s) orally once a day  latanoprost 0.005% ophthalmic solution: 1 drop(s) in the right eye once a day (at bedtime)  ofloxacin 0.3% ophthalmic solution: 1 drop(s) in the right eye 4 times a day  polyethylene glycol 3350 oral powder for reconstitution: 17 gram(s) orally once a day  prednisoLONE acetate 1% ophthalmic suspension: 1 drop(s) in the right eye 4 times a day  Protonix 40 mg oral delayed release tablet: 1 tab(s) orally once a day  senna leaf extract oral tablet: 2 tab(s) orally once a day (at bedtime)

## 2024-01-11 NOTE — PROGRESS NOTE ADULT - SUBJECTIVE AND OBJECTIVE BOX
[facility]  Juwan Patel PA-C  [address] [place] [address]  [phone]      INTERVAL HPI/OVERNIGHT EVENTS:  pt seen and examined, no new events  no further vomiting      MEDICATIONS  (STANDING):  bisacodyl 5 milliGRAM(s) Oral at bedtime  chlorhexidine 2% Cloths 1 Application(s) Topical daily  latanoprost 0.005% Ophthalmic Solution 1 Drop(s) Right EYE at bedtime  ofloxacin 0.3% Solution 1 Drop(s) Right EYE four times a day  pantoprazole  Injectable 40 milliGRAM(s) IV Push every 12 hours  polyethylene glycol 3350 17 Gram(s) Oral daily  prednisoLONE acetate 1% Suspension 1 Drop(s) Right EYE four times a day  remdesivir  IVPB 100 milliGRAM(s) IV Intermittent every 24 hours  remdesivir  IVPB   IV Intermittent   senna 2 Tablet(s) Oral at bedtime    MEDICATIONS  (PRN):      Allergies    No Known Allergies    Intolerances        ROS:   General:  No wt loss, fevers, chills, night sweats, fatigue,   Eyes:  Good vision, no reported pain  ENT:  No sore throat, pain, runny nose, dysphagia  CV:  No pain, palpitations, hypo/hypertension  Resp:  No dyspnea, cough, tachypnea, wheezing  GI:  No pain, No nausea, No vomiting, No diarrhea, No constipation, No weight loss, No fever, No pruritis, No rectal bleeding, No tarry stools, No dysphagia,  :  No pain, bleeding, incontinence, nocturia  Muscle:  No pain, weakness  Neuro:  No weakness, tingling, memory problems  Psych:  No fatigue, insomnia, mood problems, depression  Endocrine:  No polyuria, polydipsia, cold/heat intolerance  Heme:  No petechiae, ecchymosis, easy bruisability  Skin:  No rash, tattoos, scars, edema      PHYSICAL EXAM:   Vital Signs Last 24 Hrs  T(C): 36.7 (11 Jan 2024 05:35), Max: 37 (10 Gustabo 2024 19:43)  T(F): 98.1 (11 Jan 2024 05:35), Max: 98.6 (10 Gustabo 2024 19:43)  HR: 68 (11 Jan 2024 05:35) (68 - 91)  BP: 106/65 (11 Jan 2024 05:35) (106/65 - 131/83)  BP(mean): --  RR: 18 (11 Jan 2024 05:35) (18 - 18)  SpO2: 97% (11 Jan 2024 05:35) (95% - 97%)    Parameters below as of 11 Jan 2024 05:35  Patient On (Oxygen Delivery Method): room air        Daily     Daily     GENERAL:  Appears stated age,   HEENT:  NC/AT,    CHEST:  Full & symmetric excursion,   HEART:  Regular rhythm,  ABDOMEN:  Soft, non-tender, non-distended,  EXTEREMITIES:  no cyanosis  SKIN:  No rash  NEURO:  Alert,       LABS:                        14.0   3.59  )-----------( 232      ( 10 Gustabo 2024 07:06 )             42.2   01-11    x   |  x   |  x   ----------------------------<  x   x    |  x   |  1.00    Ca    8.5      10 Gustabo 2024 07:06  Phos  3.1     01-10  Mg     2.3     01-10    TPro  6.3  /  Alb  3.5  /  TBili  0.2  /  DBili  <0.1  /  AST  22  /  ALT  15  /  AlkPhos  47  01-11      Urinalysis Basic - ( 10 Gustabo 2024 07:06 )    Color: x / Appearance: x / SG: x / pH: x  Gluc: 91 mg/dL / Ketone: x  / Bili: x / Urobili: x   Blood: x / Protein: x / Nitrite: x   Leuk Esterase: x / RBC: x / WBC x   Sq Epi: x / Non Sq Epi: x / Bacteria: x        RADIOLOGY & ADDITIONAL TESTS:   [facility]  Juwan Patel PA-C  [address] [place] [address]  [phone]      INTERVAL HPI/OVERNIGHT EVENTS:  pt seen and examined, no new events  no further vomiting      MEDICATIONS  (STANDING):  bisacodyl 5 milliGRAM(s) Oral at bedtime  chlorhexidine 2% Cloths 1 Application(s) Topical daily  latanoprost 0.005% Ophthalmic Solution 1 Drop(s) Right EYE at bedtime  ofloxacin 0.3% Solution 1 Drop(s) Right EYE four times a day  pantoprazole  Injectable 40 milliGRAM(s) IV Push every 12 hours  polyethylene glycol 3350 17 Gram(s) Oral daily  prednisoLONE acetate 1% Suspension 1 Drop(s) Right EYE four times a day  remdesivir  IVPB 100 milliGRAM(s) IV Intermittent every 24 hours  remdesivir  IVPB   IV Intermittent   senna 2 Tablet(s) Oral at bedtime    MEDICATIONS  (PRN):      Allergies    No Known Allergies    Intolerances        ROS:   General:  No wt loss, fevers, chills, night sweats, fatigue,   Eyes:  Good vision, no reported pain  ENT:  No sore throat, pain, runny nose, dysphagia  CV:  No pain, palpitations, hypo/hypertension  Resp:  No dyspnea, cough, tachypnea, wheezing  GI:  No pain, No nausea, No vomiting, No diarrhea, No constipation, No weight loss, No fever, No pruritis, No rectal bleeding, No tarry stools, No dysphagia,  :  No pain, bleeding, incontinence, nocturia  Muscle:  No pain, weakness  Neuro:  No weakness, tingling, memory problems  Psych:  No fatigue, insomnia, mood problems, depression  Endocrine:  No polyuria, polydipsia, cold/heat intolerance  Heme:  No petechiae, ecchymosis, easy bruisability  Skin:  No rash, tattoos, scars, edema      PHYSICAL EXAM:   Vital Signs Last 24 Hrs  T(C): 36.7 (11 Jan 2024 05:35), Max: 37 (10 Gustabo 2024 19:43)  T(F): 98.1 (11 Jan 2024 05:35), Max: 98.6 (10 Gustabo 2024 19:43)  HR: 68 (11 Jan 2024 05:35) (68 - 91)  BP: 106/65 (11 Jan 2024 05:35) (106/65 - 131/83)  BP(mean): --  RR: 18 (11 Jan 2024 05:35) (18 - 18)  SpO2: 97% (11 Jan 2024 05:35) (95% - 97%)    Parameters below as of 11 Jan 2024 05:35  Patient On (Oxygen Delivery Method): room air        Daily     Daily     GENERAL:  Appears stated age,   HEENT:  NC/AT,    CHEST:  Full & symmetric excursion,   HEART:  Regular rhythm,  ABDOMEN:  Soft, non-tender, non-distended,  EXTEREMITIES:  no cyanosis  SKIN:  No rash  NEURO:  Alert,       LABS:                        14.0   3.59  )-----------( 232      ( 10 Gustabo 2024 07:06 )             42.2   01-11    x   |  x   |  x   ----------------------------<  x   x    |  x   |  1.00    Ca    8.5      10 Gustabo 2024 07:06  Phos  3.1     01-10  Mg     2.3     01-10    TPro  6.3  /  Alb  3.5  /  TBili  0.2  /  DBili  <0.1  /  AST  22  /  ALT  15  /  AlkPhos  47  01-11      Urinalysis Basic - ( 10 Gustabo 2024 07:06 )    Color: x / Appearance: x / SG: x / pH: x  Gluc: 91 mg/dL / Ketone: x  / Bili: x / Urobili: x   Blood: x / Protein: x / Nitrite: x   Leuk Esterase: x / RBC: x / WBC x   Sq Epi: x / Non Sq Epi: x / Bacteria: x        RADIOLOGY & ADDITIONAL TESTS:

## 2024-01-11 NOTE — DISCHARGE NOTE PROVIDER - NSDCCPCAREPLAN_GEN_ALL_CORE_FT
PRINCIPAL DISCHARGE DIAGNOSIS  Diagnosis: 2019 novel coronavirus disease (COVID-19)  Assessment and Plan of Treatment: not hypoxic  - remdesivir x 3 days  - no need for steroids  - supportive care  - CT chest        SECONDARY DISCHARGE DIAGNOSES  Diagnosis: GIB (gastrointestinal bleeding)  Assessment and Plan of Treatment:      PRINCIPAL DISCHARGE DIAGNOSIS  Diagnosis: 2019 novel coronavirus disease (COVID-19)  Assessment and Plan of Treatment: not hypoxic  - remdesivir x 3 days  - no need for steroids  - supportive care  - CT chest  followed by Pulmonary         SECONDARY DISCHARGE DIAGNOSES  Diagnosis: GIB (gastrointestinal bleeding)  Assessment and Plan of Treatment: followed by GI   No intervention     PRINCIPAL DISCHARGE DIAGNOSIS  Diagnosis: 2019 novel coronavirus disease (COVID-19)  Assessment and Plan of Treatment: not hypoxic  - remdesivir x 3 days  - no need for steroids  - supportive care  - CT chest  followed by Pulmonary   Repeat CT chest in 6 weeks         SECONDARY DISCHARGE DIAGNOSES  Diagnosis: GIB (gastrointestinal bleeding)  Assessment and Plan of Treatment: followed by GI   No intervention

## 2024-01-11 NOTE — DISCHARGE NOTE PROVIDER - CARE PROVIDER_API CALL
Dipesh Nagy  Pulmonary Disease  [address] [place] [address]  Phone: [phone]  Fax: [phone]  Follow Up Time: 1 week   Dipesh Nagy  Pulmonary Disease  [address] [place] [address]  Phone: [phone]  Fax: [phone]  Follow Up Time: 1 week   Dipesh Nagy  Pulmonary Disease  [address] [place] [address]  Phone: [phone]  Fax: [phone]  Follow Up Time: 1 week   Dipesh Nagy  Pulmonary Disease  [address] [place] [address]  Phone: [phone]  Fax: [phone]  Follow Up Time: 1 week

## 2024-01-11 NOTE — DISCHARGE NOTE NURSING/CASE MANAGEMENT/SOCIAL WORK - PATIENT PORTAL LINK FT
You can access the FollowMyHealth Patient Portal offered by [facility] by registering at the following website: [email or web address]. By joining [employer]’s FollowMyHealth portal, you will also be able to view your health information using other applications (apps) compatible with our system. You can access the FollowMyHealth Patient Portal offered by [facility] by registering at the following website: [email or web address]. By joining [employer]’s FollowMyHealth portal, you will also be able to view your health information using other applications (apps) compatible with our system.

## 2024-01-11 NOTE — DISCHARGE NOTE PROVIDER - NSDCFUADDAPPT_GEN_ALL_CORE_FT
APPTS ARE READY TO BE MADE: [x ] YES    Best Family or Patient Contact (if needed):    Additional Information about above appointments (if needed):    1: Lilo   2:   3:     Other comments or requests:    APPTS ARE READY TO BE MADE: [x ] YES    Best Family or Patient Contact (if needed):    Additional Information about above appointments (if needed):    1: Lilo   2:   3:     Other comments or requests:       Provided patient with provider referral information, however patient prefers to schedule the appointments on their own.

## 2024-01-11 NOTE — DISCHARGE NOTE NURSING/CASE MANAGEMENT/SOCIAL WORK - NSDCPEFALRISK_GEN_ALL_CORE
For information on Fall & Injury Prevention, visit: [email or web address] OR  [email or web address] OR  https://www.cdc.gov/steadi/patient.html For information on Fall & Injury Prevention, visit: [email or web address] OR  [email or web address] OR  https://www.cdc.gov/steadi/patient.html

## 2024-01-11 NOTE — CONSULT NOTE ADULT - SUBJECTIVE AND OBJECTIVE BOX
01-11-24 @ 16:07    Patient is a 68y old  Male who presents with a chief complaint of     HPI:   68-year-old male with PMHx Afib (not on AC), presenting with nausea and vomiting for day.  Patient's son at bedside.  Patient states he has been having burning in his chest in the center.  Patient also reports occasional chest pressure with associated shortness of breath.  Patient denies any recent sick contacts, recent travel.  Patient does report similar symptoms when he was found to be exposed to E. coli.  Patient denies any dysuria. (09 Jan 2024 13:51)    now heis found to have covid infection:  he is actually doing great : he snot sob:  no chest pain : feels normal  :  no smoker:  never had pe or dvt:   never had any pulm issues before   ?FOLLOWING PRESENT  [x ] Hx of PE/DVT, [ ]x Hx COPD, [ x] Hx of Asthma, [x ] Hx of Hospitalization, [ x]  Hx of BiPAP/CPAP use, [ x] Hx of KIMBERLEY    Allergies    No Known Allergies    Intolerances        PAST MEDICAL & SURGICAL HISTORY:  Afib          FAMILY HISTORY:      Social History: [ x ] TOBACCO                  [ x ] ETOH                                 [x ] IVDA/DRUGS    REVIEW OF SYSTEMS      General:	x    Skin/Breast:x  	  Ophthalmologic:x  	  ENMT:	x    Respiratory and Thorax:  sob, mild chest pain   	  Cardiovascular:	x  x  Gastrointestinal:	    Genitourinary:	x    Musculoskeletal:	x    Neurological:	x    Psychiatric:	x    Hematology/Lymphatics:	  x  Endocrine:	x    Allergic/Immunologic:	x    MEDICATIONS  (STANDING):  bisacodyl 5 milliGRAM(s) Oral at bedtime  chlorhexidine 2% Cloths 1 Application(s) Topical daily  latanoprost 0.005% Ophthalmic Solution 1 Drop(s) Right EYE at bedtime  ofloxacin 0.3% Solution 1 Drop(s) Right EYE four times a day  pantoprazole  Injectable 40 milliGRAM(s) IV Push every 12 hours  polyethylene glycol 3350 17 Gram(s) Oral daily  prednisoLONE acetate 1% Suspension 1 Drop(s) Right EYE four times a day  remdesivir  IVPB 100 milliGRAM(s) IV Intermittent every 24 hours  remdesivir  IVPB   IV Intermittent   senna 2 Tablet(s) Oral at bedtime    MEDICATIONS  (PRN):       Vital Signs Last 24 Hrs  T(C): 37.1 (11 Jan 2024 14:08), Max: 37.1 (11 Jan 2024 14:08)  T(F): 98.7 (11 Jan 2024 14:08), Max: 98.7 (11 Jan 2024 14:08)  HR: 79 (11 Jan 2024 14:08) (68 - 85)  BP: 129/80 (11 Jan 2024 14:08) (106/65 - 129/80)  BP(mean): --  RR: 18 (11 Jan 2024 14:08) (18 - 18)  SpO2: 94% (11 Jan 2024 14:08) (94% - 97%)    Parameters below as of 11 Jan 2024 14:08  Patient On (Oxygen Delivery Method): room air    Orthostatic VS          I&O's Summary    11 Jan 2024 07:01  -  11 Jan 2024 16:07  --------------------------------------------------------  IN: 240 mL / OUT: 0 mL / NET: 240 mL        Physical Exam:   GENERAL: NAD, well-groomed, well-developed  HEENT: JERARDO/   Atraumatic, Normocephalic  ENMT: No tonsillar erythema, exudates, or enlargement; Moist mucous membranes, Good dentition, No lesions  NECK: Supple, No JVD, Normal thyroid  CHEST/LUNG: Clear to auscultation bilaterally  CVS: Regular rate and rhythm; No murmurs, rubs, or gallops  GI: : Soft, Nontender, Nondistended; Bowel sounds present  NERVOUS SYSTEM:  Alert & Oriented X3  EXTREMITIES:  2+ Peripheral Pulses, No clubbing, cyanosis, or edema  LYMPH: No lymphadenopathy noted  SKIN: No rashes or lesions  ENDOCRINOLOGY: No Thyromegaly  PSYCH: Appropriate    Labs:  2.9<49<4>>31<<7.385>>2.9<<3><<4><<5<<319>>                            14.0   3.59  )-----------( 232      ( 10 Gustabo 2024 07:06 )             42.2                         14.2   4.36  )-----------( 201      ( 09 Jan 2024 07:26 )             42.2     01-11    x   |  x   |  x   ----------------------------<  x   x    |  x   |  1.00  01-10    136  |  99  |  10  ----------------------------<  91  4.0   |  26  |  1.13  01-09    131<L>  |  93<L>  |  10  ----------------------------<  97  4.8   |  21<L>  |  1.01    Ca    8.5      10 Gustabo 2024 07:06  Phos  3.1     01-10  Mg     2.3     01-10    TPro  6.3  /  Alb  3.5  /  TBili  0.2  /  DBili  <0.1  /  AST  22  /  ALT  15  /  AlkPhos  47  01-11  TPro  6.8  /  Alb  3.8  /  TBili  0.3  /  DBili  <0.1  /  AST  24  /  ALT  14  /  AlkPhos  51  01-10  TPro  7.0  /  Alb  3.9  /  TBili  0.7  /  DBili  x   /  AST  32  /  ALT  17  /  AlkPhos  56  01-09    CAPILLARY BLOOD GLUCOSE        LIVER FUNCTIONS - ( 11 Jan 2024 07:03 )  Alb: 3.5 g/dL / Pro: 6.3 g/dL / ALK PHOS: 47 U/L / ALT: 15 U/L / AST: 22 U/L / GGT: x             Urinalysis Basic - ( 10 Gustabo 2024 07:06 )    Color: x / Appearance: x / SG: x / pH: x  Gluc: 91 mg/dL / Ketone: x  / Bili: x / Urobili: x   Blood: x / Protein: x / Nitrite: x   Leuk Esterase: x / RBC: x / WBC x   Sq Epi: x / Non Sq Epi: x / Bacteria: x      D DImer  Procalcitonin, Serum: 0.15 ng/mL (01-10 @ 07:06)  D-Dimer Assay, Quantitative: 346 ng/mL DDU (01-11 @ 15:09)      Studies  Chest X-RAY  CT SCAN Chest   CT Abdomen  Venous Dopplers: LE:   Others        rad< from: CT Chest No Cont (01.10.24 @ 10:58) >  and/or proximal ureter. Adjacent partially included large cystic   structure which could be a dilated calyx or parapelvic cyst.   Subcentimeter right hepatic lobe hypodensity is too small to   characterize. Cholelithiasis. Nonspecific bilateral adrenal gland   thickening.    Bones/soft tissues: Osseous degenerative changes.  Soft tissues are   within normal limits.      IMPRESSION:    Patchy bilateral ground glass opacities and left lower lobe subpleural   consolidation, which may be infectious.    Lower lobe predominant reticular opacities which may be due to scarring   or an interstitial lung abnormality.    Partially imaged dilated right right lower pole calyx versus a parapelvic   cyst.    --- End of Report ---    < end of copied text >

## 2024-01-29 RX ORDER — PREDNISOLONE SODIUM PHOSPHATE 1 %
1 DROPS OPHTHALMIC (EYE)
Refills: 0 | DISCHARGE

## 2024-01-29 RX ORDER — LATANOPROST 0.05 MG/ML
1 SOLUTION/ DROPS OPHTHALMIC; TOPICAL
Refills: 0 | DISCHARGE

## 2024-01-29 RX ORDER — OFLOXACIN 0.3 %
1 DROPS OPHTHALMIC (EYE)
Refills: 0 | DISCHARGE

## 2024-02-20 ENCOUNTER — APPOINTMENT (OUTPATIENT)
Dept: PULMONOLOGY | Facility: CLINIC | Age: 69
End: 2024-02-20

## 2024-12-27 NOTE — ED ADULT NURSE NOTE - NSFALLRISKFACTORS_ED_ALL_ED
No indicators present Detail Level: Zone Render In Strict Bullet Format?: No Initiate Treatment: Dupixent